# Patient Record
Sex: MALE | Race: WHITE | Employment: OTHER | URBAN - METROPOLITAN AREA
[De-identification: names, ages, dates, MRNs, and addresses within clinical notes are randomized per-mention and may not be internally consistent; named-entity substitution may affect disease eponyms.]

---

## 2017-02-16 PROBLEM — M17.12 PRIMARY OSTEOARTHRITIS OF LEFT KNEE: Chronic | Status: ACTIVE | Noted: 2017-02-16

## 2017-02-17 PROBLEM — E11.9 DIABETES MELLITUS (HCC): Status: ACTIVE | Noted: 2017-02-17

## 2018-05-14 ENCOUNTER — HOSPITAL ENCOUNTER (EMERGENCY)
Age: 63
Discharge: HOME | End: 2018-05-14
Payer: COMMERCIAL

## 2018-05-14 VITALS
BODY MASS INDEX: 30.08 KG/M2 | WEIGHT: 247 LBS | DIASTOLIC BLOOD PRESSURE: 71 MMHG | RESPIRATION RATE: 16 BRPM | TEMPERATURE: 97.8 F | HEIGHT: 76 IN | SYSTOLIC BLOOD PRESSURE: 129 MMHG | OXYGEN SATURATION: 97 % | HEART RATE: 81 BPM

## 2018-05-14 DIAGNOSIS — M54.41 ACUTE RIGHT-SIDED LOW BACK PAIN WITH RIGHT-SIDED SCIATICA: Primary | ICD-10-CM

## 2018-05-14 DIAGNOSIS — M54.12 CERVICAL RADICULOPATHY: ICD-10-CM

## 2018-05-14 PROCEDURE — 96375 TX/PRO/DX INJ NEW DRUG ADDON: CPT

## 2018-05-14 PROCEDURE — 99282 EMERGENCY DEPT VISIT SF MDM: CPT

## 2018-05-14 PROCEDURE — 96374 THER/PROPH/DIAG INJ IV PUSH: CPT

## 2018-05-14 PROCEDURE — 6360000002 HC RX W HCPCS

## 2018-05-14 PROCEDURE — 6360000002 HC RX W HCPCS: Performed by: NURSE PRACTITIONER

## 2018-05-14 RX ORDER — MORPHINE SULFATE 2 MG/ML
INJECTION, SOLUTION INTRAMUSCULAR; INTRAVENOUS
Status: COMPLETED
Start: 2018-05-14 | End: 2018-05-14

## 2018-05-14 RX ORDER — MORPHINE SULFATE 8 MG/ML
8 INJECTION, SOLUTION INTRAMUSCULAR; INTRAVENOUS ONCE
Status: DISCONTINUED | OUTPATIENT
Start: 2018-05-14 | End: 2018-05-15 | Stop reason: HOSPADM

## 2018-05-14 RX ORDER — ORPHENADRINE CITRATE 30 MG/ML
60 INJECTION INTRAMUSCULAR; INTRAVENOUS ONCE
Status: COMPLETED | OUTPATIENT
Start: 2018-05-14 | End: 2018-05-14

## 2018-05-14 RX ORDER — CYCLOBENZAPRINE HCL 10 MG
10 TABLET ORAL 3 TIMES DAILY PRN
Qty: 12 TABLET | Refills: 0 | Status: SHIPPED | OUTPATIENT
Start: 2018-05-14 | End: 2018-05-15 | Stop reason: DRUGHIGH

## 2018-05-14 RX ORDER — HYDROCODONE BITARTRATE AND ACETAMINOPHEN 5; 325 MG/1; MG/1
1 TABLET ORAL EVERY 6 HOURS PRN
Qty: 12 TABLET | Refills: 0 | Status: SHIPPED | OUTPATIENT
Start: 2018-05-14 | End: 2018-05-15 | Stop reason: DRUGHIGH

## 2018-05-14 RX ORDER — METHYLPREDNISOLONE 4 MG/1
TABLET ORAL
Qty: 1 KIT | Status: SHIPPED | OUTPATIENT
Start: 2018-05-14 | End: 2018-05-15 | Stop reason: DRUGHIGH

## 2018-05-14 RX ORDER — DEXAMETHASONE SODIUM PHOSPHATE 10 MG/ML
6 INJECTION INTRAMUSCULAR; INTRAVENOUS ONCE
Status: COMPLETED | OUTPATIENT
Start: 2018-05-14 | End: 2018-05-14

## 2018-05-14 RX ADMIN — DEXAMETHASONE SODIUM PHOSPHATE 6 MG: 10 INJECTION INTRAMUSCULAR; INTRAVENOUS at 22:33

## 2018-05-14 RX ADMIN — ORPHENADRINE CITRATE 60 MG: 30 INJECTION INTRAMUSCULAR; INTRAVENOUS at 22:33

## 2018-05-14 RX ADMIN — MORPHINE SULFATE 8 MG: 2 INJECTION, SOLUTION INTRAMUSCULAR; INTRAVENOUS at 22:33

## 2018-05-14 ASSESSMENT — PAIN DESCRIPTION - FREQUENCY: FREQUENCY: CONTINUOUS

## 2018-05-14 ASSESSMENT — PAIN SCALES - GENERAL
PAINLEVEL_OUTOF10: 8
PAINLEVEL_OUTOF10: 7

## 2018-05-14 ASSESSMENT — PAIN DESCRIPTION - PROGRESSION: CLINICAL_PROGRESSION: GRADUALLY WORSENING

## 2018-05-14 ASSESSMENT — PAIN DESCRIPTION - DESCRIPTORS: DESCRIPTORS: SHARP

## 2018-05-14 ASSESSMENT — PAIN DESCRIPTION - PAIN TYPE: TYPE: ACUTE PAIN;CHRONIC PAIN

## 2018-05-14 ASSESSMENT — PAIN DESCRIPTION - ONSET: ONSET: GRADUAL

## 2018-05-14 ASSESSMENT — PAIN DESCRIPTION - ORIENTATION: ORIENTATION: LOWER;MID

## 2018-05-14 ASSESSMENT — PAIN DESCRIPTION - LOCATION: LOCATION: BACK

## 2018-05-15 RX ORDER — METHYLPREDNISOLONE 4 MG/1
TABLET ORAL
Qty: 1 KIT | Status: SHIPPED | OUTPATIENT
Start: 2018-05-15 | End: 2018-05-21

## 2018-05-15 RX ORDER — HYDROCODONE BITARTRATE AND ACETAMINOPHEN 5; 325 MG/1; MG/1
1 TABLET ORAL EVERY 6 HOURS PRN
Qty: 12 TABLET | Refills: 0 | Status: SHIPPED | OUTPATIENT
Start: 2018-05-15 | End: 2018-05-18

## 2018-05-15 RX ORDER — CYCLOBENZAPRINE HCL 10 MG
10 TABLET ORAL 3 TIMES DAILY PRN
Qty: 12 TABLET | Refills: 0 | Status: SHIPPED | OUTPATIENT
Start: 2018-05-15 | End: 2018-05-25

## 2019-07-08 ENCOUNTER — APPOINTMENT (OUTPATIENT)
Dept: GENERAL RADIOLOGY | Age: 64
End: 2019-07-08
Attending: INTERNAL MEDICINE
Payer: COMMERCIAL

## 2019-07-08 ENCOUNTER — HOSPITAL ENCOUNTER (OUTPATIENT)
Age: 64
Setting detail: OBSERVATION
Discharge: HOME | End: 2019-07-10
Attending: INTERNAL MEDICINE | Admitting: INTERNAL MEDICINE
Payer: COMMERCIAL

## 2019-07-08 ENCOUNTER — APPOINTMENT (OUTPATIENT)
Dept: MRI IMAGING | Age: 64
End: 2019-07-08
Attending: INTERNAL MEDICINE
Payer: COMMERCIAL

## 2019-07-08 PROBLEM — G45.9 TIA (TRANSIENT ISCHEMIC ATTACK): Status: ACTIVE | Noted: 2019-07-08

## 2019-07-08 LAB
ALBUMIN SERPL-MCNC: 4.4 G/DL (ref 3.5–5.2)
ALP BLD-CCNC: 48 U/L (ref 40–129)
ALT SERPL-CCNC: 23 U/L (ref 0–40)
ANION GAP SERPL CALCULATED.3IONS-SCNC: 15 MMOL/L (ref 7–16)
AST SERPL-CCNC: 26 U/L (ref 0–39)
BILIRUB SERPL-MCNC: 0.3 MG/DL (ref 0–1.2)
BUN BLDV-MCNC: 13 MG/DL (ref 8–23)
CALCIUM SERPL-MCNC: 10.3 MG/DL (ref 8.6–10.2)
CHLORIDE BLD-SCNC: 103 MMOL/L (ref 98–107)
CK MB: 2.6 NG/ML (ref 0–7.7)
CO2: 20 MMOL/L (ref 22–29)
CREAT SERPL-MCNC: 0.7 MG/DL (ref 0.7–1.2)
GFR AFRICAN AMERICAN: >60
GFR NON-AFRICAN AMERICAN: >60 ML/MIN/1.73
GLUCOSE BLD-MCNC: 135 MG/DL (ref 74–99)
HCT VFR BLD CALC: 40.3 % (ref 37–54)
HEMOGLOBIN: 13.4 G/DL (ref 12.5–16.5)
LACTIC ACID: 1.9 MMOL/L (ref 0.5–2.2)
MCH RBC QN AUTO: 31.1 PG (ref 26–35)
MCHC RBC AUTO-ENTMCNC: 33.3 % (ref 32–34.5)
MCV RBC AUTO: 93.5 FL (ref 80–99.9)
METER GLUCOSE: 143 MG/DL (ref 74–99)
PDW BLD-RTO: 14 FL (ref 11.5–15)
PLATELET # BLD: 243 E9/L (ref 130–450)
PMV BLD AUTO: 8.8 FL (ref 7–12)
POTASSIUM SERPL-SCNC: 4.2 MMOL/L (ref 3.5–5)
RBC # BLD: 4.31 E12/L (ref 3.8–5.8)
SODIUM BLD-SCNC: 138 MMOL/L (ref 132–146)
TOTAL CK: 106 U/L (ref 20–200)
TOTAL PROTEIN: 7.2 G/DL (ref 6.4–8.3)
TROPONIN: <0.01 NG/ML (ref 0–0.03)
WBC # BLD: 6.7 E9/L (ref 4.5–11.5)

## 2019-07-08 PROCEDURE — 82550 ASSAY OF CK (CPK): CPT

## 2019-07-08 PROCEDURE — 6370000000 HC RX 637 (ALT 250 FOR IP): Performed by: INTERNAL MEDICINE

## 2019-07-08 PROCEDURE — 71045 X-RAY EXAM CHEST 1 VIEW: CPT

## 2019-07-08 PROCEDURE — 36415 COLL VENOUS BLD VENIPUNCTURE: CPT

## 2019-07-08 PROCEDURE — 84484 ASSAY OF TROPONIN QUANT: CPT

## 2019-07-08 PROCEDURE — 70551 MRI BRAIN STEM W/O DYE: CPT

## 2019-07-08 PROCEDURE — 2060000000 HC ICU INTERMEDIATE R&B

## 2019-07-08 PROCEDURE — 93005 ELECTROCARDIOGRAM TRACING: CPT | Performed by: INTERNAL MEDICINE

## 2019-07-08 PROCEDURE — G0378 HOSPITAL OBSERVATION PER HR: HCPCS

## 2019-07-08 PROCEDURE — 80053 COMPREHEN METABOLIC PANEL: CPT

## 2019-07-08 PROCEDURE — 6370000000 HC RX 637 (ALT 250 FOR IP): Performed by: HOSPITALIST

## 2019-07-08 PROCEDURE — 82553 CREATINE MB FRACTION: CPT

## 2019-07-08 PROCEDURE — 6360000002 HC RX W HCPCS: Performed by: HOSPITALIST

## 2019-07-08 PROCEDURE — 96374 THER/PROPH/DIAG INJ IV PUSH: CPT

## 2019-07-08 PROCEDURE — 6370000000 HC RX 637 (ALT 250 FOR IP)

## 2019-07-08 PROCEDURE — 82962 GLUCOSE BLOOD TEST: CPT

## 2019-07-08 PROCEDURE — 83605 ASSAY OF LACTIC ACID: CPT

## 2019-07-08 PROCEDURE — 85027 COMPLETE CBC AUTOMATED: CPT

## 2019-07-08 RX ORDER — ATORVASTATIN CALCIUM 40 MG/1
40 TABLET, FILM COATED ORAL NIGHTLY
Status: DISCONTINUED | OUTPATIENT
Start: 2019-07-08 | End: 2019-07-10 | Stop reason: HOSPADM

## 2019-07-08 RX ORDER — GEMFIBROZIL 600 MG/1
600 TABLET, FILM COATED ORAL
COMMUNITY
End: 2020-02-17 | Stop reason: ALTCHOICE

## 2019-07-08 RX ORDER — ONDANSETRON 2 MG/ML
4 INJECTION INTRAMUSCULAR; INTRAVENOUS EVERY 6 HOURS PRN
Status: DISCONTINUED | OUTPATIENT
Start: 2019-07-08 | End: 2019-07-10 | Stop reason: HOSPADM

## 2019-07-08 RX ORDER — NITROGLYCERIN 0.4 MG/1
0.4 TABLET SUBLINGUAL EVERY 5 MIN PRN
Status: DISCONTINUED | OUTPATIENT
Start: 2019-07-08 | End: 2019-07-10 | Stop reason: HOSPADM

## 2019-07-08 RX ORDER — NITROGLYCERIN 0.4 MG/1
TABLET SUBLINGUAL
Status: COMPLETED
Start: 2019-07-08 | End: 2019-07-08

## 2019-07-08 RX ORDER — ASPIRIN 81 MG/1
TABLET, CHEWABLE ORAL
Status: COMPLETED
Start: 2019-07-08 | End: 2019-07-08

## 2019-07-08 RX ORDER — GEMFIBROZIL 600 MG/1
600 TABLET, FILM COATED ORAL
Status: DISCONTINUED | OUTPATIENT
Start: 2019-07-09 | End: 2019-07-10 | Stop reason: HOSPADM

## 2019-07-08 RX ORDER — DEXTROSE MONOHYDRATE 25 G/50ML
12.5 INJECTION, SOLUTION INTRAVENOUS PRN
Status: DISCONTINUED | OUTPATIENT
Start: 2019-07-08 | End: 2019-07-10 | Stop reason: HOSPADM

## 2019-07-08 RX ORDER — NICOTINE POLACRILEX 4 MG
15 LOZENGE BUCCAL PRN
Status: DISCONTINUED | OUTPATIENT
Start: 2019-07-08 | End: 2019-07-10 | Stop reason: HOSPADM

## 2019-07-08 RX ORDER — ASPIRIN 81 MG/1
81 TABLET ORAL DAILY
Status: DISCONTINUED | OUTPATIENT
Start: 2019-07-09 | End: 2019-07-09

## 2019-07-08 RX ORDER — LISINOPRIL 10 MG/1
10 TABLET ORAL DAILY
Status: DISCONTINUED | OUTPATIENT
Start: 2019-07-09 | End: 2019-07-10 | Stop reason: HOSPADM

## 2019-07-08 RX ORDER — OXYCODONE HYDROCHLORIDE AND ACETAMINOPHEN 5; 325 MG/1; MG/1
1 TABLET ORAL EVERY 4 HOURS PRN
Status: DISCONTINUED | OUTPATIENT
Start: 2019-07-08 | End: 2019-07-10 | Stop reason: HOSPADM

## 2019-07-08 RX ORDER — DEXTROSE MONOHYDRATE 50 MG/ML
100 INJECTION, SOLUTION INTRAVENOUS PRN
Status: DISCONTINUED | OUTPATIENT
Start: 2019-07-08 | End: 2019-07-10 | Stop reason: HOSPADM

## 2019-07-08 RX ORDER — SODIUM CHLORIDE 0.9 % (FLUSH) 0.9 %
10 SYRINGE (ML) INJECTION EVERY 12 HOURS SCHEDULED
Status: DISCONTINUED | OUTPATIENT
Start: 2019-07-08 | End: 2019-07-10 | Stop reason: HOSPADM

## 2019-07-08 RX ORDER — LORAZEPAM 2 MG/ML
0.5 INJECTION INTRAMUSCULAR ONCE
Status: COMPLETED | OUTPATIENT
Start: 2019-07-08 | End: 2019-07-08

## 2019-07-08 RX ORDER — SODIUM CHLORIDE 0.9 % (FLUSH) 0.9 %
10 SYRINGE (ML) INJECTION PRN
Status: DISCONTINUED | OUTPATIENT
Start: 2019-07-08 | End: 2019-07-10 | Stop reason: HOSPADM

## 2019-07-08 RX ADMIN — LORAZEPAM 0.5 MG: 2 INJECTION INTRAMUSCULAR; INTRAVENOUS at 23:07

## 2019-07-08 RX ADMIN — OXYCODONE HYDROCHLORIDE AND ACETAMINOPHEN 1 TABLET: 5; 325 TABLET ORAL at 23:08

## 2019-07-08 RX ADMIN — NITROGLYCERIN: 0.4 TABLET, ORALLY DISINTEGRATING SUBLINGUAL at 21:56

## 2019-07-08 RX ADMIN — ASPIRIN 325 MG: 325 TABLET, COATED ORAL at 21:40

## 2019-07-08 RX ADMIN — NITROGLYCERIN: 0.4 TABLET, ORALLY DISINTEGRATING SUBLINGUAL at 21:38

## 2019-07-08 RX ADMIN — ASPIRIN 81 MG 324 MG: 81 TABLET ORAL at 21:55

## 2019-07-08 ASSESSMENT — PAIN - FUNCTIONAL ASSESSMENT: PAIN_FUNCTIONAL_ASSESSMENT: 0-10

## 2019-07-09 ENCOUNTER — APPOINTMENT (OUTPATIENT)
Dept: CT IMAGING | Age: 64
End: 2019-07-09
Attending: INTERNAL MEDICINE
Payer: COMMERCIAL

## 2019-07-09 LAB
ALBUMIN SERPL-MCNC: 4.2 G/DL (ref 3.5–5.2)
ALP BLD-CCNC: 47 U/L (ref 40–129)
ALT SERPL-CCNC: 25 U/L (ref 0–40)
ANION GAP SERPL CALCULATED.3IONS-SCNC: 16 MMOL/L (ref 7–16)
AST SERPL-CCNC: 33 U/L (ref 0–39)
BASOPHILS ABSOLUTE: 0.03 E9/L (ref 0–0.2)
BASOPHILS RELATIVE PERCENT: 0.5 % (ref 0–2)
BILIRUB SERPL-MCNC: 0.3 MG/DL (ref 0–1.2)
BUN BLDV-MCNC: 15 MG/DL (ref 8–23)
CALCIUM SERPL-MCNC: 9.7 MG/DL (ref 8.6–10.2)
CHLORIDE BLD-SCNC: 102 MMOL/L (ref 98–107)
CHOLESTEROL, FASTING: 193 MG/DL (ref 0–199)
CO2: 21 MMOL/L (ref 22–29)
CREAT SERPL-MCNC: 0.9 MG/DL (ref 0.7–1.2)
EOSINOPHILS ABSOLUTE: 0.23 E9/L (ref 0.05–0.5)
EOSINOPHILS RELATIVE PERCENT: 3.8 % (ref 0–6)
GFR AFRICAN AMERICAN: >60
GFR NON-AFRICAN AMERICAN: >60 ML/MIN/1.73
GLUCOSE BLD-MCNC: 167 MG/DL (ref 74–99)
HBA1C MFR BLD: 8.1 % (ref 4–5.6)
HCT VFR BLD CALC: 38.8 % (ref 37–54)
HDLC SERPL-MCNC: 25 MG/DL
HEMOGLOBIN: 12.5 G/DL (ref 12.5–16.5)
IMMATURE GRANULOCYTES #: 0.05 E9/L
IMMATURE GRANULOCYTES %: 0.8 % (ref 0–5)
LDL CHOLESTEROL CALCULATED: ABNORMAL MG/DL (ref 0–99)
LYMPHOCYTES ABSOLUTE: 2.31 E9/L (ref 1.5–4)
LYMPHOCYTES RELATIVE PERCENT: 38.2 % (ref 20–42)
MAGNESIUM: 2.1 MG/DL (ref 1.6–2.6)
MCH RBC QN AUTO: 30.8 PG (ref 26–35)
MCHC RBC AUTO-ENTMCNC: 32.2 % (ref 32–34.5)
MCV RBC AUTO: 95.6 FL (ref 80–99.9)
METER GLUCOSE: 123 MG/DL (ref 74–99)
METER GLUCOSE: 166 MG/DL (ref 74–99)
METER GLUCOSE: 206 MG/DL (ref 74–99)
METER GLUCOSE: 211 MG/DL (ref 74–99)
METER GLUCOSE: 236 MG/DL (ref 74–99)
MONOCYTES ABSOLUTE: 0.53 E9/L (ref 0.1–0.95)
MONOCYTES RELATIVE PERCENT: 8.8 % (ref 2–12)
NEUTROPHILS ABSOLUTE: 2.89 E9/L (ref 1.8–7.3)
NEUTROPHILS RELATIVE PERCENT: 47.9 % (ref 43–80)
PDW BLD-RTO: 14.2 FL (ref 11.5–15)
PLATELET # BLD: 235 E9/L (ref 130–450)
PMV BLD AUTO: 9.1 FL (ref 7–12)
POTASSIUM REFLEX MAGNESIUM: 4.4 MMOL/L (ref 3.5–5)
RBC # BLD: 4.06 E12/L (ref 3.8–5.8)
SODIUM BLD-SCNC: 139 MMOL/L (ref 132–146)
TOTAL PROTEIN: 6.5 G/DL (ref 6.4–8.3)
TRIGLYCERIDE, FASTING: 468 MG/DL (ref 0–149)
VLDLC SERPL CALC-MCNC: ABNORMAL MG/DL
WBC # BLD: 6 E9/L (ref 4.5–11.5)

## 2019-07-09 PROCEDURE — 96375 TX/PRO/DX INJ NEW DRUG ADDON: CPT

## 2019-07-09 PROCEDURE — 80061 LIPID PANEL: CPT

## 2019-07-09 PROCEDURE — 96376 TX/PRO/DX INJ SAME DRUG ADON: CPT

## 2019-07-09 PROCEDURE — 2580000003 HC RX 258: Performed by: HOSPITALIST

## 2019-07-09 PROCEDURE — 6370000000 HC RX 637 (ALT 250 FOR IP): Performed by: HOSPITALIST

## 2019-07-09 PROCEDURE — 70498 CT ANGIOGRAPHY NECK: CPT

## 2019-07-09 PROCEDURE — G0378 HOSPITAL OBSERVATION PER HR: HCPCS

## 2019-07-09 PROCEDURE — 85025 COMPLETE CBC W/AUTO DIFF WBC: CPT

## 2019-07-09 PROCEDURE — 82962 GLUCOSE BLOOD TEST: CPT

## 2019-07-09 PROCEDURE — 6360000002 HC RX W HCPCS: Performed by: INTERNAL MEDICINE

## 2019-07-09 PROCEDURE — 97162 PT EVAL MOD COMPLEX 30 MIN: CPT

## 2019-07-09 PROCEDURE — 6360000002 HC RX W HCPCS: Performed by: HOSPITALIST

## 2019-07-09 PROCEDURE — 92523 SPEECH SOUND LANG COMPREHEN: CPT

## 2019-07-09 PROCEDURE — 83735 ASSAY OF MAGNESIUM: CPT

## 2019-07-09 PROCEDURE — 97166 OT EVAL MOD COMPLEX 45 MIN: CPT

## 2019-07-09 PROCEDURE — 97535 SELF CARE MNGMENT TRAINING: CPT

## 2019-07-09 PROCEDURE — 99218 PR INITIAL OBSERVATION CARE/DAY 30 MINUTES: CPT | Performed by: PSYCHIATRY & NEUROLOGY

## 2019-07-09 PROCEDURE — 96372 THER/PROPH/DIAG INJ SC/IM: CPT

## 2019-07-09 PROCEDURE — 80053 COMPREHEN METABOLIC PANEL: CPT

## 2019-07-09 PROCEDURE — 36415 COLL VENOUS BLD VENIPUNCTURE: CPT

## 2019-07-09 PROCEDURE — 83036 HEMOGLOBIN GLYCOSYLATED A1C: CPT

## 2019-07-09 PROCEDURE — 70496 CT ANGIOGRAPHY HEAD: CPT

## 2019-07-09 PROCEDURE — 97530 THERAPEUTIC ACTIVITIES: CPT

## 2019-07-09 PROCEDURE — 6360000004 HC RX CONTRAST MEDICATION: Performed by: RADIOLOGY

## 2019-07-09 RX ORDER — KETOROLAC TROMETHAMINE 30 MG/ML
15 INJECTION, SOLUTION INTRAMUSCULAR; INTRAVENOUS EVERY 6 HOURS
Status: DISCONTINUED | OUTPATIENT
Start: 2019-07-09 | End: 2019-07-10 | Stop reason: HOSPADM

## 2019-07-09 RX ORDER — ASPIRIN 81 MG/1
162 TABLET ORAL DAILY
Status: DISCONTINUED | OUTPATIENT
Start: 2019-07-10 | End: 2019-07-10 | Stop reason: HOSPADM

## 2019-07-09 RX ADMIN — Medication 10 ML: at 00:34

## 2019-07-09 RX ADMIN — IOPAMIDOL 60 ML: 755 INJECTION, SOLUTION INTRAVENOUS at 00:09

## 2019-07-09 RX ADMIN — OXYCODONE HYDROCHLORIDE AND ACETAMINOPHEN 1 TABLET: 5; 325 TABLET ORAL at 12:03

## 2019-07-09 RX ADMIN — OXYCODONE HYDROCHLORIDE AND ACETAMINOPHEN 1 TABLET: 5; 325 TABLET ORAL at 03:24

## 2019-07-09 RX ADMIN — OXYCODONE HYDROCHLORIDE AND ACETAMINOPHEN 1 TABLET: 5; 325 TABLET ORAL at 07:54

## 2019-07-09 RX ADMIN — INSULIN LISPRO 2 UNITS: 100 INJECTION, SOLUTION INTRAVENOUS; SUBCUTANEOUS at 07:54

## 2019-07-09 RX ADMIN — LISINOPRIL 10 MG: 10 TABLET ORAL at 09:22

## 2019-07-09 RX ADMIN — ENOXAPARIN SODIUM 40 MG: 40 INJECTION SUBCUTANEOUS at 09:22

## 2019-07-09 RX ADMIN — ATORVASTATIN CALCIUM 40 MG: 40 TABLET, FILM COATED ORAL at 00:33

## 2019-07-09 RX ADMIN — INSULIN LISPRO 4 UNITS: 100 INJECTION, SOLUTION INTRAVENOUS; SUBCUTANEOUS at 17:49

## 2019-07-09 RX ADMIN — KETOROLAC TROMETHAMINE 15 MG: 30 INJECTION, SOLUTION INTRAMUSCULAR at 16:34

## 2019-07-09 RX ADMIN — INSULIN LISPRO 4 UNITS: 100 INJECTION, SOLUTION INTRAVENOUS; SUBCUTANEOUS at 12:12

## 2019-07-09 RX ADMIN — Medication 10 ML: at 09:22

## 2019-07-09 RX ADMIN — ASPIRIN 81 MG: 81 TABLET ORAL at 09:22

## 2019-07-09 RX ADMIN — GEMFIBROZIL 600 MG: 600 TABLET ORAL at 16:34

## 2019-07-09 RX ADMIN — Medication 10 ML: at 21:08

## 2019-07-09 RX ADMIN — INSULIN LISPRO 4 UNITS: 100 INJECTION, SOLUTION INTRAVENOUS; SUBCUTANEOUS at 21:06

## 2019-07-09 RX ADMIN — KETOROLAC TROMETHAMINE 15 MG: 30 INJECTION, SOLUTION INTRAMUSCULAR at 21:07

## 2019-07-09 RX ADMIN — ATORVASTATIN CALCIUM 40 MG: 40 TABLET, FILM COATED ORAL at 21:06

## 2019-07-09 RX ADMIN — GEMFIBROZIL 600 MG: 600 TABLET ORAL at 06:23

## 2019-07-09 ASSESSMENT — PAIN SCALES - GENERAL
PAINLEVEL_OUTOF10: 9
PAINLEVEL_OUTOF10: 0
PAINLEVEL_OUTOF10: 6
PAINLEVEL_OUTOF10: 9
PAINLEVEL_OUTOF10: 8
PAINLEVEL_OUTOF10: 10

## 2019-07-09 NOTE — PROGRESS NOTES
Physical Therapy  Initial Assessment     Name: Florencio Kirby  : 1955  MRN: [id number]    Date of Service: 2019    Evaluating PT: Dorie Ash, PT, DPT [id number]    Room #:  7022/2684-K    Diagnosis: TIA  Precautions: Fall risk, dysarthria    Pt lives with wife in a single story house with 0 stair(s) and 0 rail(s) to enter. Bed is on the first floor and bath is on the first floor. Pt ambulated without AD Independent prior to admission. Pt owns SPC, quad cane, and [employer]. HPI: Pt presented to the ED on  for L facial droop and slurred speech. Initial Evaluation  Date: 19 Treatment Date: Short Term/ Long Term   Goals   AM-PAC 6 Clicks 61/90     Was pt agreeable to Eval/treatment? Yes      Does pt have pain? 6-7/10 L chest pain, 9-10/10 R headache, neck, and shoulder pain     Bed Mobility  Rolling: NT  Supine to sit: SBA  Sit to supine: NT  Scooting: SBA toward EOB  Rolling: Independent   Supine to sit: Independent   Sit to supine: Independent   Scooting: Independent    Transfers Sit to stand: Min A  Stand to sit: Min A  Stand pivot: Mod A with [employer]  Sit to stand: Supervision  Stand to sit: Supervision  Stand pivot: SBA with [employer]   Ambulation   30 feet with [employer] with Mod A  200 feet with [employer] with SBA   Stair negotiation: NT  NA   ROM B UE: Refer to OT note  B LE: WFL     Strength B UE: Refer to OT note  B LE: 4-/5 grossly     Balance Sitting EOB: SBA  Dynamic standing: Mod A with [employer]  Sitting EOB: Supervision  Dynamic standing: SBA with WW     Pt is A & O x: 4 to person, place, month/year, and situation. Sensation: Pt denies numbness and tingling to extremities. Coordination: NT  Edema: Unremarkable. ASSESSMENT    Patient education  Pt educated on proper technique with [employer].    Patient response to education:   Pt verbalized understanding Pt demonstrated skill Pt requires further education in this area   Yes Yes  Reinforce     Comments:  Pt was supine in bed upon room entry, agreeable to PT evaluation.  Pt is dysarthric and difficult to understand at times. Pt performed supine to sit transfer and required increased time to accomplish task. Pt has chronic R shoulder pain and complained of increased pain with mobility. Pt performed sit to stand transfer and ambulated short distance within room. Pt ambulates with slow gait speed and small steps. Pt cued for upright gaze. Pt ambulated to neighbor's bed and reported he was dizzy. Pt requested to return to bed due to symptoms. Pt ambulated back to bedside chair and was seated. Symptoms subsided once sitting. Pt verbalized understanding to call for assistance when he wishes to return to bed. Pt was left seated with all needs met at conclusion of session. Pts/family goals: To return home. Patient and or family understand(s) diagnosis, prognosis, and plan of care:  Yes. PLAN  PT care will be provided in accordance with the objectives noted above. Whenever appropriate, clear delegation orders will be provided for nursing staff. Exercises and functional mobility practice will be used as well as appropriate assistive devices or modalities to obtain goals. Patient and family education will also be administered as needed. Frequency of treatments: 2-5x/week x 2-3 days.     Time in: 1140  Time out: 4228 Christopher Vasquez, PT, DPT  [id number]

## 2019-07-09 NOTE — PROGRESS NOTES
7/9/2019 1400  Gross per 24 hour   Intake 840 ml   Output --   Net 840 ml       Labs:   Recent Labs     07/08/19 2149 07/09/19 0648   WBC 6.7 6.0   HGB 13.4 12.5   HCT 40.3 38.8    235       Recent Labs     07/08/19 2149 07/09/19  0648    139   K 4.2 4.4    102   CO2 20* 21*   BUN 13 15   CREATININE 0.7 0.9   CALCIUM 10.3* 9.7       Recent Labs     07/08/19 2149 07/09/19  0648   PROT 7.2 6.5   ALKPHOS 48 47   ALT 23 25   AST 26 33   BILITOT 0.3 0.3       No results for input(s): INR in the last 72 hours. Recent Labs     07/08/19 2149   CKTOTAL 106   TROPONINI <0.01       Chronic labs:  Lab Results   Component Value Date    HDL 25 07/09/2019    LDLCALC - (AA) 07/09/2019    LABA1C 8.1 (H) 07/09/2019       Radiology:  Imaging studies reviewed today. ASSESSMENT:    Active Problems:    TIA (transient ischemic attack)  Resolved Problems:    * No resolved hospital problems. *  DM2  HTN  HLD  Smoker  Bronchitis     PLAN:  Neuro consulted awaiting recs  Chest pain is musculoskeletal in nature  -toradol started  PT/OT - pt may need some rehab  Glycemic control       Diet: DIET CARDIAC;  Code Status: Full Code  PT/OT Eval Status:   ordered  DVT Prophylaxis:   lovenox  Recommended disposition at discharge:  pending pt/ot    +++++++++++++++++++++++++++++++++++++++++++++++++  Elizabeth Neil MD   [facility].  +++++++++++++++++++++++++++++++++++++++++++++++++  NOTE: This report was transcribed using voice recognition software.  Every effort was made to ensure accuracy; however, inadvertent computerized transcription errors may be present.

## 2019-07-09 NOTE — PROGRESS NOTES
in 1 week  if warranted. Patient stated goals: Agreed with above  Treatment goals discussed with Patient  The Patient understand the diagnosis, prognosis and plan of care. The admitting diagnosis and active problem list, as listed below have been reviewed prior to initiation of this evaluation.      ADMITTING DIAGNOSIS: TIA (transient ischemic attack) [G45.9]     ACTIVE PROBLEM LIST:   Patient Active Problem List   Diagnosis    Primary osteoarthritis of left knee    Diabetes mellitus ([facility] [address]    TIA (transient ischemic attack)

## 2019-07-09 NOTE — H&P
noted in the HPI. All other systems reviewed and negative. PHYSICAL EXAM PERFORMED:    /71   Pulse 91   Temp 97.2 °F (36.2 °C) (Temporal)   Resp 16   Ht 6' 4\" (1.93 m)   Wt 242 lb 3.2 oz (109.9 kg)   SpO2 95%   BMI 29.48 kg/m²     General appearance:  No apparent distress, appears stated age and cooperative. HEENT:  Normal cephalic, atraumatic without obvious deformity. Pupils equal, round, and reactive to light. Extra ocular muscles intact. Conjunctivae/corneas clear. Neck: Supple, with full range of motion. No jugular venous distention. Trachea midline. Respiratory:  Normal respiratory effort. Clear to auscultation, bilaterally without Rales/Wheezes/Rhonchi. Chest wall pain reproducible  Cardiovascular:  Regular rate and rhythm with normal S1/S2 without murmurs, rubs or gallops. Abdomen: Soft, non-tender, non-distended with normal bowel sounds. Musculoskeletal:  No clubbing, cyanosis or edema bilaterally. Full range of motion without deformity. Skin: Skin color, texture, turgor normal.  No rashes or lesions. Neurologic:  Slurred speech, left sided facial droop, LUE possible mild weakness ? Shoulder pain limiting ROM left leg weak due to pain ? Psychiatric:  Alert and oriented, thought content appropriate, normal insight        Labs:     Recent Labs     07/08/19  2149   WBC 6.7   HGB 13.4   HCT 40.3        No results for input(s): NA, K, CL, CO2, BUN, CREATININE, CALCIUM, PHOS in the last 72 hours. Invalid input(s): MAGNES  No results for input(s): AST, ALT, BILIDIR, BILITOT, ALKPHOS in the last 72 hours. No results for input(s): INR in the last 72 hours. No results for input(s): Laurel Callander in the last 72 hours.     Urinalysis:    No results found for: Harshal Paredes, BACTERIA, RBCUA, BLOODU, [address], [address]    Radiology:         XR CHEST PORTABLE    (Results Pending)       ASSESSMENT:    Active Hospital Problems    Diagnosis Date Noted    TIA (transient ischemic attack) [G45.9] 07/08/2019       60 yo male hx dm htn hlp on meds controlled came to osh at 445 pm having stroke like symptoms of left facial droop/slurred speech and NIH 3, ekg nsr, PT/INR/CBC unremarkable, ct head negative , sent here for eval, on way here had left upper chest wall pain , ekg negative, has diffuse OA pain in shoulder legs, stat labs ordered pending      TIA vs CVA  htn  Dm  hlp  Smoker  Chest wall pain noncardiac    Observation  R/o cva  Home meds/  Neuro c/s  Stat mri/mra head/neck after labs- cxr negative cbc unremarkable  Trop negative  iss    DVT Prophylaxis: lovenox  Diet: No diet orders on file  Code Status: Prior    PT/OT Eval Status: ordered    Dispo - obs       Igor Turner MD    Thank you No primary care provider on file. for the opportunity to be involved in this patient's care. If you have any questions or concerns please feel free to contact me at [phone].

## 2019-07-09 NOTE — PROGRESS NOTES
Occupational Therapy  . OCCUPATIONAL THERAPY INITIAL EVALUATION      Date:2019  Patient Name: Brisa Foley  MRN: [id number]  : 1955  Room: [facility]    Modified Fields Scale   Score     Description  0             No symptoms  1             No significant disability despite symptoms  2             Slight disability; able to look after own affairs  3             Moderate disability; able to ambulate without assist/ requires assist with ADLs  4             Moderate/Severe disability;requires assist to ambulate/assist with ADLs  5             Severe disability;bedridden/incontinent   6               Score:   4    Evaluating OT: MAITE Tamayo/L     AM-PAC Daily Activity Raw Score:   Recommended Adaptive Equipment: continue to assess     Comments: Based on patient's functional performance as stated above and level of assistance needed prior to admission, this therapist believes that the patient would benefit from interferes with ADLs so that patient cannot function at a less intensive care level of care during evaluation and treatment      Diagnosis: TIA; L sided weakness impaired speech     Pertinent Medical History: OA, R Rotator cuff repair/injury, L TKA    Precautions:  Falls, dysarthric speech      Home Living: Pt lives wife (calls her momma)-can assist)  in a 1 story with 12 step(s) to enter front 0 [address] back door, rail(s) on front steps; bed/bath on 1st floor  Bathroom setup: Tub-Shower combo  Equipment owned: shower chair, cane, [employer], BSC? Prior Level of Function: IND with ADLs shared assist with mom with IADLs; using no AD for ambulation.  +light homemaking   Driving: yes  Occupation: working as  on [email or web address]    Pain Level: L pectoral region and HA  Cognition: A&O: 3; Follows 1-2 step directions   Memory: G   Sequencing: F+   Problem solving: F   Judgement/safety: F     Functional Assessment:   Initial Eval Status  Date:  Treatment Status  Date: Short Term Goals  Treatment

## 2019-07-09 NOTE — PROGRESS NOTES
Patient states that the Percocet is not working for his pain and asking for something else. Dr. Adele Oliveros notified via [employer] of patient's request. Awaiting new orders/call back    New order received for toradol.

## 2019-07-09 NOTE — CONSULTS
VII: facial muscle function - upper  Normal   VII: facial muscle function - lower Normal   VIII: hearing Normal   IX: soft palate elevation  Normal   IX,X: gag reflex NA   XI: trapezius strength  Diminished on R 2/2 pain   XI: sternocleidomastoid strength Diminished on R 2/2 pain   XI: neck extension strength  Limited 2/2 chronic pain   XII: tongue strength  Normal     Motor:   Right   5/5              Left   5/5               Right Bicep  5/5           Left Bicep  5/5                     Right Quadriceps  5/5          Left Quadriceps    5/5           Right Gastrocnemius    5/5    Left Gastrocnemius   5/5  Right Ant Tibialis   5/5  Left Ant Tibialis   5/5   5/5 throughout  Normal bulk and tone   No drift  No abnormal movements    Sensory:  LT diminished on L face, otherwise wnl    Coordination:   Normal finger to nose and heel to shin    Gait:  Not assessed    DTR:   Right Brachioradialis reflex 2+  Left Brachioradialis reflex 2+  Right Biceps reflex 2+  Left Biceps reflex 2+  Right Triceps reflex 2+  Left Triceps reflex 2+  Right Quadriceps reflex 2+  Left Quadriceps reflex 2+  Right Achilles reflex 2+  Left Achilles reflex 2+    No Babinskis  No Torres's     No other pathological reflexes noted.     Laboratory/Radiology:     Results for orders placed or performed during the hospital encounter of 07/08/19   CBC   Result Value Ref Range    WBC 6.7 4.5 - 11.5 E9/L    RBC 4.31 3.80 - 5.80 E12/L    Hemoglobin 13.4 12.5 - 16.5 g/dL    Hematocrit 40.3 37.0 - 54.0 %    MCV 93.5 80.0 - 99.9 fL    MCH 31.1 26.0 - 35.0 pg    MCHC 33.3 32.0 - 34.5 %    RDW 14.0 11.5 - 15.0 fL    Platelets 094 307 - 811 E9/L    MPV 8.8 7.0 - 12.0 fL   Comprehensive Metabolic Panel   Result Value Ref Range    Sodium 138 132 - 146 mmol/L    Potassium 4.2 3.5 - 5.0 mmol/L    Chloride 103 98 - 107 mmol/L    CO2 20 (L) 22 - 29 mmol/L    Anion Gap 15 7 - 16 mmol/L    Glucose 135 (H) 74 - 99 mg/dL    BUN 13 8 - 23 mg/dL    CREATININE 0.7 0.7 - 1.2 mg/dL    GFR Non-African American >60 >=60 mL/min/1.73    GFR African American >60     Calcium 10.3 (H) 8.6 - 10.2 mg/dL    Total Protein 7.2 6.4 - 8.3 g/dL    Alb 4.4 3.5 - 5.2 g/dL    Total Bilirubin 0.3 0.0 - 1.2 mg/dL    Alkaline Phosphatase 48 40 - 129 U/L    ALT 23 0 - 40 U/L    AST 26 0 - 39 U/L   Lactic Acid, Plasma   Result Value Ref Range    Lactic Acid 1.9 0.5 - 2.2 mmol/L   Troponin   Result Value Ref Range    Troponin <0.01 0.00 - 0.03 ng/mL   CK   Result Value Ref Range    Total  20 - 200 U/L   CK MB   Result Value Ref Range    CK-MB 2.6 0.0 - 7.7 ng/mL   Comprehensive Metabolic Panel w/ Reflex to MG   Result Value Ref Range    Sodium 139 132 - 146 mmol/L    Potassium reflex Magnesium 4.4 3.5 - 5.0 mmol/L    Chloride 102 98 - 107 mmol/L    CO2 21 (L) 22 - 29 mmol/L    Anion Gap 16 7 - 16 mmol/L    Glucose 167 (H) 74 - 99 mg/dL    BUN 15 8 - 23 mg/dL    CREATININE 0.9 0.7 - 1.2 mg/dL    GFR Non-African American >60 >=60 mL/min/1.73    GFR African American >60     Calcium 9.7 8.6 - 10.2 mg/dL    Total Protein 6.5 6.4 - 8.3 g/dL    Alb 4.2 3.5 - 5.2 g/dL    Total Bilirubin 0.3 0.0 - 1.2 mg/dL    Alkaline Phosphatase 47 40 - 129 U/L    ALT 25 0 - 40 U/L    AST 33 0 - 39 U/L   Magnesium   Result Value Ref Range    Magnesium 2.1 1.6 - 2.6 mg/dL   CBC auto differential   Result Value Ref Range    WBC 6.0 4.5 - 11.5 E9/L    RBC 4.06 3.80 - 5.80 E12/L    Hemoglobin 12.5 12.5 - 16.5 g/dL    Hematocrit 38.8 37.0 - 54.0 %    MCV 95.6 80.0 - 99.9 fL    MCH 30.8 26.0 - 35.0 pg    MCHC 32.2 32.0 - 34.5 %    RDW 14.2 11.5 - 15.0 fL    Platelets 171 235 - 371 E9/L    MPV 9.1 7.0 - 12.0 fL    Neutrophils % 47.9 43.0 - 80.0 %    Immature Granulocytes % 0.8 0.0 - 5.0 %    Lymphocytes % 38.2 20.0 - 42.0 %    Monocytes % 8.8 2.0 - 12.0 %    Eosinophils % 3.8 0.0 - 6.0 %    Basophils % 0.5 0.0 - 2.0 %    Neutrophils # 2.89 1.80 - 7.30 E9/L    Immature Granulocytes # 0.05 E9/L    Lymphocytes # 2.31 1.50 - 4.00

## 2019-07-10 VITALS
TEMPERATURE: 97.8 F | HEIGHT: 76 IN | WEIGHT: 244.13 LBS | SYSTOLIC BLOOD PRESSURE: 113 MMHG | HEART RATE: 84 BPM | BODY MASS INDEX: 29.73 KG/M2 | RESPIRATION RATE: 20 BRPM | OXYGEN SATURATION: 97 % | DIASTOLIC BLOOD PRESSURE: 71 MMHG

## 2019-07-10 PROBLEM — Z87.891 EX-SMOKER: Status: ACTIVE | Noted: 2019-07-10

## 2019-07-10 PROBLEM — E78.5 HYPERLIPIDEMIA: Status: ACTIVE | Noted: 2019-07-10

## 2019-07-10 LAB
EKG ATRIAL RATE: 97 BPM
EKG P AXIS: 40 DEGREES
EKG P-R INTERVAL: 146 MS
EKG Q-T INTERVAL: 332 MS
EKG QRS DURATION: 86 MS
EKG QTC CALCULATION (BAZETT): 421 MS
EKG R AXIS: 27 DEGREES
EKG T AXIS: 27 DEGREES
EKG VENTRICULAR RATE: 97 BPM
LV EF: 65 %
LVEF MODALITY: NORMAL
METER GLUCOSE: 162 MG/DL (ref 74–99)
METER GLUCOSE: 202 MG/DL (ref 74–99)

## 2019-07-10 PROCEDURE — 93010 ELECTROCARDIOGRAM REPORT: CPT | Performed by: INTERNAL MEDICINE

## 2019-07-10 PROCEDURE — G0378 HOSPITAL OBSERVATION PER HR: HCPCS

## 2019-07-10 PROCEDURE — 96372 THER/PROPH/DIAG INJ SC/IM: CPT

## 2019-07-10 PROCEDURE — 6370000000 HC RX 637 (ALT 250 FOR IP): Performed by: PSYCHIATRY & NEUROLOGY

## 2019-07-10 PROCEDURE — 2580000003 HC RX 258: Performed by: HOSPITALIST

## 2019-07-10 PROCEDURE — 6370000000 HC RX 637 (ALT 250 FOR IP): Performed by: HOSPITALIST

## 2019-07-10 PROCEDURE — 97110 THERAPEUTIC EXERCISES: CPT

## 2019-07-10 PROCEDURE — 96376 TX/PRO/DX INJ SAME DRUG ADON: CPT

## 2019-07-10 PROCEDURE — 92507 TX SP LANG VOICE COMM INDIV: CPT

## 2019-07-10 PROCEDURE — 6360000002 HC RX W HCPCS: Performed by: INTERNAL MEDICINE

## 2019-07-10 PROCEDURE — 93306 TTE W/DOPPLER COMPLETE: CPT

## 2019-07-10 PROCEDURE — 6360000002 HC RX W HCPCS: Performed by: HOSPITALIST

## 2019-07-10 PROCEDURE — 82962 GLUCOSE BLOOD TEST: CPT

## 2019-07-10 RX ORDER — ATORVASTATIN CALCIUM 40 MG/1
40 TABLET, FILM COATED ORAL NIGHTLY
Qty: 30 TABLET | Refills: 3 | Status: SHIPPED | OUTPATIENT
Start: 2019-07-10

## 2019-07-10 RX ADMIN — GEMFIBROZIL 600 MG: 600 TABLET ORAL at 06:51

## 2019-07-10 RX ADMIN — INSULIN LISPRO 4 UNITS: 100 INJECTION, SOLUTION INTRAVENOUS; SUBCUTANEOUS at 12:36

## 2019-07-10 RX ADMIN — KETOROLAC TROMETHAMINE 15 MG: 30 INJECTION, SOLUTION INTRAMUSCULAR at 11:06

## 2019-07-10 RX ADMIN — Medication 10 ML: at 09:01

## 2019-07-10 RX ADMIN — ASPIRIN 162 MG: 81 TABLET ORAL at 09:01

## 2019-07-10 RX ADMIN — KETOROLAC TROMETHAMINE 15 MG: 30 INJECTION, SOLUTION INTRAMUSCULAR at 04:42

## 2019-07-10 RX ADMIN — INSULIN LISPRO 2 UNITS: 100 INJECTION, SOLUTION INTRAVENOUS; SUBCUTANEOUS at 09:00

## 2019-07-10 RX ADMIN — ENOXAPARIN SODIUM 40 MG: 40 INJECTION SUBCUTANEOUS at 09:03

## 2019-07-10 ASSESSMENT — PAIN SCALES - GENERAL
PAINLEVEL_OUTOF10: 5
PAINLEVEL_OUTOF10: 0
PAINLEVEL_OUTOF10: 5

## 2019-07-10 NOTE — DISCHARGE SUMMARY
Hospital Medicine Discharge Summary    Patient ID: Laura Carlota      Patient's PCP: Amelie Wilcox DO    Admit Date: 7/8/2019     Discharge Date: 7/10/2019      Admitting Physician: Claritza Mcpherson DO     Discharge Physician: JAMAL Zarate - CNP     Discharge Diagnoses: Active Hospital Problems    Diagnosis Date Noted    Hyperlipidemia [E78.5] 07/10/2019    Ex-smoker [N05.613] 07/10/2019    TIA (transient ischemic attack) [G45.9] 07/08/2019    Diabetes mellitus ([facility] [address] [E11.9] 02/17/2017       The patient was seen and examined on day of discharge and this discharge summary is in conjunction with any daily progress note from day of discharge. Hospital Course: 60 yo male hx dm htn hlp on meds controlled came to OSH at 445 pm having stroke like symptoms of left facial droop/slurred speech and NIH 3, ekg nsr, PT/INR/CBC unremarkable, ct head negative , sent here for eval, on way here had left upper chest wall pain , ekg negative, has diffuse OA pain in shoulder legs. MRI of the brain negative CTA of neck and head negative. Neurology evaluated patient. Patient determined to have TIA. Patient was discharged in stable condition to home. Exam:     /71   Pulse 84   Temp 97.8 °F (36.6 °C) (Temporal)   Resp 20   Ht 6' 4\" (1.93 m)   Wt 244 lb 2 oz (110.7 kg)   SpO2 97%   BMI 29.72 kg/m²     General appearance: No apparent distress, appears stated age and cooperative. HEENT: Pupils equal, round, and reactive to light. Conjunctivae/corneas clear. Neck: Supple, with full range of motion. No jugular venous distention. Trachea midline. Respiratory:  Normal respiratory effort. Clear to auscultation, bilaterally without Rales/Wheezes/Rhonchi. Cardiovascular: Regular rate and rhythm with normal S1/S2 without murmurs, rubs or gallops. Abdomen: Soft, non-tender, non-distended with normal bowel sounds. Musculoskeletal: No clubbing, cyanosis or edema bilaterally.   Full range of motion without deformity. Skin: Skin color, texture, turgor normal.  No rashes or lesions. Neurologic:  Neurovascularly intact without any focal sensory/motor deficits. Psychiatric: Alert and oriented, thought content appropriate, normal insight    Consults:     IP CONSULT TO NEUROLOGY    Significant Diagnostic Studies:    Xr Chest Portable  Result Date: 7/8/2019  NO ACUTE CARDIOPULMONARY PROCESS     Cta Neck W Contrast  Result Date: 7/9/2019  1. Calcified plaque bilateral proximal ICA causing mild, less than 50% ICA stenosis using NASCET criteria. 2. Patent bilateral cervical vertebral arteries. COMMENT:  Reference per NASCET criteria for degree of stenosis: Mild: less than 50% stenosis. Moderate: 50-69% stenosis. Severe: 70-94% stenosis. Near occlusion: 95-99% stenosis. This report has been electronically signed by Cecil Live MD.    Cta Head W Contrast  Result Date: 7/9/2019  No acute findings. This report has been electronically signed by Cecil Live MD.    Mri Brain Wo Contrast  Result Date: 7/9/2019  No acute stroke or other acute process. No mass. Senescent changes with atrophy, white matter chronic ischemia, remote left periventricular lacune. This report has been electronically signed by Cecil Live MD.      Disposition:   Home in stable condition    Discharge Instructions/Follow-up:    Continue medications, increase aspirin to 2 baby aspirin daily, start statin  Follow-up in stroke clinic  Follow-up with PCP    Code Status:  Full Code     Activity: activity as tolerated    Diet: cardiac diet    Labs:  For convenience and continuity at follow-up the following most recent labs are provided:      CBC:    Lab Results   Component Value Date    WBC 6.0 07/09/2019    HGB 12.5 07/09/2019    HCT 38.8 07/09/2019     07/09/2019       Renal:    Lab Results   Component Value Date     07/09/2019    K 4.4 07/09/2019     07/09/2019    CO2 21 07/09/2019    BUN 15 07/09/2019    CREATININE 0.9 07/09/2019

## 2019-07-10 NOTE — CARE COORDINATION
Patient more alert today, left eye open with good eye contact, no slurred speech and chest pain gone. He is  sitting up in chair. Explained PT AM NITO 14, OT AM NITO 13 and benefit of USHA and HHC with PT/OT. Pt declined USHA & HHC. He said he's walking better with no walker. Phoned wife Jyoti Barillaseron, explain PT/OT evals and benefit of USHA or HHC PT/OT. She aslo declined both. Plan is home with wife.   Corey Sinha RN CM

## 2019-07-10 NOTE — PROGRESS NOTES
Pt seen for speech therapy. Handout was provided pt on oral motor and coordination exercises that can be completed at home to improve intelligibility and strength. Education was provided on the importance of independent completion of exercises. Pt completed the oral motor and coordination exercises with fair- good ability. Pt demonstrated mild speech distortions. Will continue.      [facility]  Speech Language Pathologist Student Intern

## 2020-02-17 ENCOUNTER — OFFICE VISIT (OUTPATIENT)
Dept: NEUROLOGY | Age: 65
End: 2020-02-17
Payer: COMMERCIAL

## 2020-02-17 VITALS
WEIGHT: 241 LBS | SYSTOLIC BLOOD PRESSURE: 111 MMHG | OXYGEN SATURATION: 95 % | HEART RATE: 94 BPM | BODY MASS INDEX: 29.35 KG/M2 | HEIGHT: 76 IN | TEMPERATURE: 98.1 F | DIASTOLIC BLOOD PRESSURE: 70 MMHG

## 2020-02-17 PROCEDURE — 99205 OFFICE O/P NEW HI 60 MIN: CPT | Performed by: NURSE PRACTITIONER

## 2020-02-17 RX ORDER — FENOFIBRATE 200 MG/1
CAPSULE ORAL
COMMUNITY
Start: 2020-01-22

## 2020-02-17 RX ORDER — ICOSAPENT ETHYL 1000 MG/1
CAPSULE ORAL
COMMUNITY
Start: 2020-01-27

## 2020-02-17 RX ORDER — GLIMEPIRIDE 2 MG/1
2 TABLET ORAL
COMMUNITY

## 2020-02-17 RX ORDER — CLOPIDOGREL BISULFATE 75 MG/1
75 TABLET ORAL DAILY
Qty: 30 TABLET | Refills: 1 | Status: ON HOLD
Start: 2020-02-17 | End: 2022-04-21 | Stop reason: HOSPADM

## 2020-02-17 NOTE — LETTER
[facility]  [address] [facility] [address]  Phone: [phone]  Fax: [phone] Anisa Childress, JAMAL - NP        February 17, 2020     Patient: Amelie Greene   YOB: 1955   Date of Visit: 2/17/2020       To Whom It May Concern: It is my medical opinion that Barry Caraballo seen in my office today. Can return to work tomorrow 2/18/20. If you have any questions or concerns, please don't hesitate to call.     Sincerely,        JAMAL Lentz NP

## 2021-10-23 NOTE — PROGRESS NOTES
[address] [facility]. Ana Livingston M.D., F.A.C.P. Ingrid Cortez, DNP, APRN, CNS  Gildardo Garcia. Zack Nunes, MSN, APRN-FNP-C  Early Senior MSN, APRN, FNP-C  JUAN MANUEL [address] MSN, APRN, FNP-C  [address] [facility] [address], [address] [place] [address]  Phone: [phone]  Fax: [phone]       Will Parsons is a 62 y.o. right handed male     Patient presents today for evaluation and management of multiple TIAs and chronic daily headaches. Patient presents to his appointment with his wife--patient is a questionable historian however wife is there to provide additional history. Past Medical History:     Past Medical History:   Diagnosis Date    Diabetes mellitus ([address]     Hyperlipidemia     Hypertension     Osteoarthritis     TIA (transient ischemic attack)      Past Surgical History:       Past Surgical History:   Procedure Laterality Date    KNEE SURGERY Left     fracture repair    ROTATOR CUFF REPAIR Bilateral     TOTAL KNEE ARTHROPLASTY Left 02/16/2017    x3      Allergies:       Acetaminophen and Acetaminophen-codeine    Medications:     Prior to Admission medications    Medication Sig Start Date End Date Taking? Authorizing Provider   canagliflozin-metformin HCl (INVOKAMET)  MG Take 50-1,000 mg by mouth   Yes Historical Provider, MD   fenofibrate micronized (LOFIBRA) 200 MG capsule TAKE ONE CAPSULE BY MOUTH ONCE EVERY DAY WITH A MEAL.  1/22/20  Yes Historical Provider, MD   VASCEPA 1 g CAPS capsule TAKE TWO CAPSULES BY MOUTH TWO TIMES A DAY WITH MEALS 1/27/20  Yes Historical Provider, MD   glimepiride (AMARYL) 2 MG tablet Take 2 mg by mouth   Yes Historical Provider, MD   Semaglutide,0.25 or 0.5MG/DOS, 2 MG/1.5ML SOPN Inject 0.5 mg into the skin   Yes Historical Provider, MD   aspirin 81 MG tablet Take 162 mg by mouth daily   Yes Historical Provider, MD   clopidogrel (PLAVIX) 75 MG tablet Take 1 tablet by mouth daily 2/17/20  Yes Keira Subjective:    Lanny Dodd s a 61 year old female who presents today for right wrist pain that started on Thursday. She was lifting some cases of water. The following day the wrist swelled up and \"puffy.\" She was icing the wrist.     Current Medications    AMLODIPINE (NORVASC) 10 MG TABLET    TAKE 1 TABLET BY MOUTH DAILY    ASPIRIN 81 MG TABLET    Take 1 tablet by mouth daily.    BLOOD GLUCOSE MONITORING SUPPL (ONE TOUCH ULTRA 2) W/DEVICE KIT    USE AS DIRECTED    EMPAGLIFLOZIN (JARDIANCE) 25 MG TABLET    Take 1 tablet by mouth daily (before breakfast).    GLIPIZIDE (GLUCOTROL ER) 10 MG 24 HR TABLET    TAKE 1 TABLET BY MOUTH DAILY    HYDROCODONE-ACETAMINOPHEN (NORCO) 5-325 MG PER TABLET    Take 1 tablet by mouth every 8 hours as needed for Pain.    IBUPROFEN (MOTRIN) 800 MG TABLET    TAKE 1 TABLET BY MOUTH EVERY 6 HOURS AS NEEDED FOR PAIN    LANCETS (ONETOUCH DELICA PLUS [id number]) MISC    USE AS DIRECTED THREE TIMES DAILY    LANCETS (ONETOUCH DELICA PLUS [id number]) MISC    USE TO TEST BLOOD SUGARS THREE TIMES DAILY    METFORMIN (GLUCOPHAGE) 1000 MG TABLET    TAKE 1 TABLET BY MOUTH TWICE DAILY WITH MEALS    MULTIPLE VITAMINS-MINERALS (MULTIVITAMIN ADULT PO)        ONE TOUCH ULTRA TEST TEST STRIP    TEST THREE TIMES DAILY AS DIRECTED    ONETOUCH ULTRA TEST STRIP    USE TO TEST BLOOD SUGARS THREE TIMES DAILY AS DIRECTED    ROSUVASTATIN (CRESTOR) 10 MG TABLET    Take 1 tablet by mouth daily.    VITAMIN D, ERGOCALCIFEROL, 1.25 MG (50,000 UNITS) CAPSULE    TAKE 1 CAPSULE BY MOUTH ONCE A WEEK    VITAMIN E 400 UNIT CAPSULE    Take 400 Units by mouth daily.      ALLERGIES:   Allergen Reactions   • Amoxicillin-Pot Clavulanate DIARRHEA   • Amoxicillin PRURITUS      Patient Active Problem List   Diagnosis   • Subserous leiomyoma of uterus   • Chronic rhinitis   • Bacterial vaginosis   • Urinary frequency   • Thrombophlebitis of breast (Mondor's disease)   • Intramural leiomyoma of uterus   • Benign essential hypertension   •  Foot mass   • Cerebral meningioma (CMS/[facility])   • Hyperlipidemia LDL goal <100   • Acute recurrent maxillary sinusitis   • Atherosclerotic heart disease of native coronary artery without angina pectoris   • Type 2 diabetes mellitus without complication, without long-term current use of insulin (CMS/[facility])      Social History     Tobacco Use   • Smoking status: Never Smoker   • Smokeless tobacco: Never Used   Substance Use Topics   • Alcohol use: No     Alcohol/week: 0.0 standard drinks     Comment: rarely   • Drug use: No       Exam:    Vitals:    10/23/21 1559   BP: 122/70   Pulse: 91   Temp: 98.2 °F (36.8 °C)   SpO2: 100%   Weight: 107 kg (236 lb)        GENERAL APPEARANCE:  Well-nourished, in no acute distress.  PSYCHIATRIC: The patient is awake, alert, and oriented x3. Recent and remote memory is intact. Appropriate mood and affect.  SKIN: Warm, dry, and well perfused. Good turgor.    Right wrist: Neg Phalen and tinel's test, + TTP over radial tendon, + finkelstein's test. Mild swelling.  Normal capillary refill.    Plan:  1. Tendonitis of wrist, right  Pt has symptoms of right wrist tendonitis vs deQuervain. I am recommending RICE, use wrist brace with thumb spica, use ibuprofen TID for 7 days. Edu material and red flag symptoms provided. F/U in 1 week with pcp if needed.         including MRI, CTAs and echo was unremarkable for stroke. Patient states he was referred to follow-up with us here locally. In addition to 3 prior TIAs, patient also chronically suffers from neck and back pain and now suffers daily headaches. Patient describes his headaches as radiating from right ear to right shoulder 8 out of 10 in pain at present time. Normally patient's headache starts 4-5/10 in pain and increases throughout the day. Patient describes his pain as stabbing and throbbing. Patient also complains of light sensitivity with intermittent blurred vision and double vision which resolves when one eye is covered. Right upper extremity numbness and tingling is also intermittent during these headaches. Patient has been offered physical therapy in the past however he declines this. Patient has never been presented to neurosurgery from what he is saying. Patient denies nausea, vomiting, speech or swallowing difficulties, or sound sensitivity. In addition to chronic daily headaches and prior TIAs, patient also suffers from hypertension, hyperlipidemia, diabetes mellitus and osteoarthritis. Patient is also had multiple left knee surgeries including total knee replacement and bilateral rotator cuff repair. Patient reports he sleeps 4 to 6 hours each night and wakes up tired. Patient drinks water all day however also drinks 1-2 pots of coffee each day in addition to tea. Patient eats 3 meals a day with snacks at night. Patient does not exercise outside of working which includes carrying heavy pieces of material 10 to 50 pounds.     Objective:       /70 (Site: Right Upper Arm, Position: Sitting, Cuff Size: Medium Adult)   Pulse 94   Temp 98.1 °F (36.7 °C)   Ht 6' 4\" (1.93 m)   Wt 241 lb (109.3 kg)   SpO2 95%   BMI 29.34 kg/m²     General appearance: alert, appears stated age, cooperative with much encouragement and in no distress  Head: normocephalic, without obvious abnormality, atraumatic; left temporal lobe tenderness; extreme tenderness to occipital groove bilaterally right more than left  Eyes: conjunctivae/corneas clear; no drainage  Neck: no carotid bruit, supple, symmetrical, trachea midline and thyroid not enlarged  Back: symmetric, no curvature.  ROM normal.  No trapezius muscle tenderness  Lungs: clear to auscultation bilaterally  Heart: regular rate and rhythm  Abdomen: soft, non-tender; bowel sounds normal  Extremities: normal, atraumatic, no cyanosis or edema  Pulses: 1+ and symmetric  Skin:  color, texture, turgor normal--no rashes or lesions      Mental Status: alert and oriented x 4; laconic and grumpy    Appropriate attention/concentration  Intact fundus of knowledge  Memories intact    Speech: no dysarthria  Language: no aphasias---reading, writing, repetition, and object identification intact    Cranial Nerves:  I: smell  intact   II: visual acuity     II: visual fields Full to finger counting bilaterally and confrontation   II: pupils PERRL   III,VII: ptosis None   III,IV,VI: extraocular muscles  EOMI without nystagmus   V: mastication Normal   V: facial light touch sensation   light touch decreased to left face compared to right   V,VII: corneal reflex     VII: facial muscle function - upper   left upper facial droop   VII: facial muscle function - lower  left lower facial droop   VIII: hearing Normal   IX: soft palate elevation  Normal   IX,X: gag reflex    XI: trapezius strength  4/5 L, 2/5 R   XI: sternocleidomastoid strength 4/5 L, 2/5 R   XI: neck extension strength  Limited    XII: tongue strength  Normal     Motor:  Bilateral upper extremity weakness right more than left-- RUE 2+/5, LUE 4/5  Normal bulk and tone  R pronator drift   No abnormal movements    Sensory:  LT, PP and vibration decreased to left side when compared to the right    Coordination:   FN, FFM and JANE normal  HS normal    Gait:  Slow, cautious, arthritic, antalgic gait    DTR:   Right regions. MRI brain w/o 1/25/2020: No evidence of intracranial hemorrhage, mass effect, or acute infarct. CTA head and neck with CT perfusion 1/24/2020: Atherosclerotic disease without hemodynamically significant stenosis. No proximal occlusion or high-grade stenosis. Absolute mismatch volume: 0 ml  Mismatch ratio: None    CT head 1/24/2020: No acute intracranial hemorrhage. Complete echo 1/25/2020:  · No prior study available for comparison. · Left ventricle is of normal size. · Normal left ventricular diastolic function is present. · There is normal left ventricular wall motion. · The left ventricular systolic function is normal. The left ventricular     ejection fraction is 65% (normal LVEF is 55%-74%). · The right ventricle has normal size, wall thickness and systolic      function. · No significant valve disease    All labs and images were personally reviewed at the time of this visit    Assessment:     Cryptogenic TIA   Previously on aspirin 162 mg and Lipitor 40 mg--- will add Plavix   Atherosclerotic disease without hemodynamically significant stenosis on CTA    Chronic daily headaches--bilateral occipital neuralgia right more than left in addition to tension type   MRI cervical spine proves several areas of severe right neuroforaminal narrowing, severe bilateral   neuroforaminal narrowing and degenerative changes.    Extreme tenderness to occipital groove right more than left in addition to left temporal pain   Patient has tried muscle relaxants in the past with no improvement   Patient declines physical therapy   I believe patient will benefit from occipital nerve blocks from pain management    Lifestyle modifications   chronic sleep deprivation in addition to excessive caffeine use is provoking daily headaches   Better sleep hygiene recommended in addition to over-the-counter sleep aid   Significant reduction in caffeine use recommended    Plan:     Education and encouragement provided today during visit  Referral to pain management for occipital nerve blocks  Plavix 75 mg daily ordered  Lifestyle modifications as listed above  Better sleep hygiene  Reurn to office in 3 months  Call with any issues      JAMAL Louise NP-C   5:33 PM  2/17/2020    I spent 65 minutes with this patient obtaining the HPI and discussing the exam with greater than 50% of the time providing counseling and education on medications and other treatment plans. All questions were answered prior to leaving my office.

## 2022-04-18 ENCOUNTER — APPOINTMENT (OUTPATIENT)
Dept: CT IMAGING | Age: 67
DRG: 480 | End: 2022-04-18
Payer: MEDICARE

## 2022-04-18 ENCOUNTER — HOSPITAL ENCOUNTER (INPATIENT)
Age: 67
LOS: 3 days | Discharge: HOME HEALTH | DRG: 480 | End: 2022-04-21
Attending: INTERNAL MEDICINE | Admitting: INTERNAL MEDICINE
Payer: MEDICARE

## 2022-04-18 ENCOUNTER — APPOINTMENT (OUTPATIENT)
Dept: GENERAL RADIOLOGY | Age: 67
DRG: 480 | End: 2022-04-18
Payer: MEDICARE
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
## 2022-04-18 DIAGNOSIS — M25.562 ACUTE PAIN OF LEFT KNEE: ICD-10-CM

## 2022-04-18 DIAGNOSIS — S72.001A CLOSED FRACTURE OF NECK OF RIGHT FEMUR, INITIAL ENCOUNTER (HCC): Primary | ICD-10-CM

## 2022-04-18 LAB
ANION GAP SERPL CALCULATED.3IONS-SCNC: 11 MMOL/L (ref 7–16)
APTT: 28.5 SEC (ref 24.5–35.1)
BASOPHILS ABSOLUTE: 0.03 E9/L (ref 0–0.2)
BASOPHILS RELATIVE PERCENT: 0.6 % (ref 0–2)
BUN BLDV-MCNC: 14 MG/DL (ref 6–23)
CALCIUM SERPL-MCNC: 9.8 MG/DL (ref 8.6–10.2)
CHLORIDE BLD-SCNC: 104 MMOL/L (ref 98–107)
CO2: 23 MMOL/L (ref 22–29)
CREAT SERPL-MCNC: 0.7 MG/DL (ref 0.7–1.2)
EOSINOPHILS ABSOLUTE: 0.21 E9/L (ref 0.05–0.5)
EOSINOPHILS RELATIVE PERCENT: 3.9 % (ref 0–6)
GFR AFRICAN AMERICAN: >60
GFR NON-AFRICAN AMERICAN: >60 ML/MIN/1.73
GLUCOSE BLD-MCNC: 155 MG/DL (ref 74–99)
HCT VFR BLD CALC: 46.3 % (ref 37–54)
HEMOGLOBIN: 15.6 G/DL (ref 12.5–16.5)
IMMATURE GRANULOCYTES #: 0.02 E9/L
IMMATURE GRANULOCYTES %: 0.4 % (ref 0–5)
INR BLD: 1
LYMPHOCYTES ABSOLUTE: 1.68 E9/L (ref 1.5–4)
LYMPHOCYTES RELATIVE PERCENT: 31.3 % (ref 20–42)
MCH RBC QN AUTO: 31 PG (ref 26–35)
MCHC RBC AUTO-ENTMCNC: 33.7 % (ref 32–34.5)
MCV RBC AUTO: 92 FL (ref 80–99.9)
METER GLUCOSE: 221 MG/DL (ref 74–99)
MONOCYTES ABSOLUTE: 0.39 E9/L (ref 0.1–0.95)
MONOCYTES RELATIVE PERCENT: 7.3 % (ref 2–12)
NEUTROPHILS ABSOLUTE: 3.03 E9/L (ref 1.8–7.3)
NEUTROPHILS RELATIVE PERCENT: 56.5 % (ref 43–80)
PDW BLD-RTO: 13.3 FL (ref 11.5–15)
PLATELET # BLD: 227 E9/L (ref 130–450)
PMV BLD AUTO: 9 FL (ref 7–12)
POTASSIUM REFLEX MAGNESIUM: 4.8 MMOL/L (ref 3.5–5)
PROTHROMBIN TIME: 11.3 SEC (ref 9.3–12.4)
RBC # BLD: 5.03 E12/L (ref 3.8–5.8)
SODIUM BLD-SCNC: 138 MMOL/L (ref 132–146)
WBC # BLD: 5.4 E9/L (ref 4.5–11.5)

## 2022-04-18 PROCEDURE — 6370000000 HC RX 637 (ALT 250 FOR IP): Performed by: PHYSICIAN ASSISTANT

## 2022-04-18 PROCEDURE — 82962 GLUCOSE BLOOD TEST: CPT

## 2022-04-18 PROCEDURE — 1200000000 HC SEMI PRIVATE

## 2022-04-18 PROCEDURE — 73562 X-RAY EXAM OF KNEE 3: CPT

## 2022-04-18 PROCEDURE — 93005 ELECTROCARDIOGRAM TRACING: CPT | Performed by: PHYSICIAN ASSISTANT

## 2022-04-18 PROCEDURE — 85610 PROTHROMBIN TIME: CPT

## 2022-04-18 PROCEDURE — 85025 COMPLETE CBC W/AUTO DIFF WBC: CPT

## 2022-04-18 PROCEDURE — 71046 X-RAY EXAM CHEST 2 VIEWS: CPT

## 2022-04-18 PROCEDURE — 73610 X-RAY EXAM OF ANKLE: CPT

## 2022-04-18 PROCEDURE — 6370000000 HC RX 637 (ALT 250 FOR IP): Performed by: INTERNAL MEDICINE

## 2022-04-18 PROCEDURE — 73521 X-RAY EXAM HIPS BI 2 VIEWS: CPT

## 2022-04-18 PROCEDURE — 80048 BASIC METABOLIC PNL TOTAL CA: CPT

## 2022-04-18 PROCEDURE — 99285 EMERGENCY DEPT VISIT HI MDM: CPT

## 2022-04-18 PROCEDURE — 99222 1ST HOSP IP/OBS MODERATE 55: CPT | Performed by: INTERNAL MEDICINE

## 2022-04-18 PROCEDURE — 85730 THROMBOPLASTIN TIME PARTIAL: CPT

## 2022-04-18 PROCEDURE — 73700 CT LOWER EXTREMITY W/O DYE: CPT

## 2022-04-18 PROCEDURE — 2580000003 HC RX 258: Performed by: GENERAL ACUTE CARE HOSPITAL

## 2022-04-18 RX ORDER — ASPIRIN 325 MG
162 TABLET ORAL DAILY
Status: DISCONTINUED | OUTPATIENT
Start: 2022-04-20 | End: 2022-04-21 | Stop reason: HOSPADM

## 2022-04-18 RX ORDER — GLIPIZIDE 5 MG/1
5 TABLET ORAL
Status: DISCONTINUED | OUTPATIENT
Start: 2022-04-18 | End: 2022-04-19

## 2022-04-18 RX ORDER — SEMAGLUTIDE 1.34 MG/ML
0.5 INJECTION, SOLUTION SUBCUTANEOUS WEEKLY
COMMUNITY
Start: 2022-01-12

## 2022-04-18 RX ORDER — OXYCODONE AND ACETAMINOPHEN 10; 325 MG/1; MG/1
1 TABLET ORAL ONCE
Status: COMPLETED | OUTPATIENT
Start: 2022-04-18 | End: 2022-04-18

## 2022-04-18 RX ORDER — EMPAGLIFLOZIN 25 MG/1
25 TABLET, FILM COATED ORAL DAILY
Status: ON HOLD | COMMUNITY
End: 2022-04-21 | Stop reason: HOSPADM

## 2022-04-18 RX ORDER — OMEGA-3-ACID ETHYL ESTERS 1 G/1
1 CAPSULE, LIQUID FILLED ORAL 2 TIMES DAILY
Status: DISCONTINUED | OUTPATIENT
Start: 2022-04-18 | End: 2022-04-21 | Stop reason: HOSPADM

## 2022-04-18 RX ORDER — SODIUM CHLORIDE 0.9 % (FLUSH) 0.9 %
5-40 SYRINGE (ML) INJECTION EVERY 12 HOURS SCHEDULED
Status: DISCONTINUED | OUTPATIENT
Start: 2022-04-18 | End: 2022-04-19 | Stop reason: HOSPADM

## 2022-04-18 RX ORDER — SODIUM CHLORIDE 9 MG/ML
25 INJECTION, SOLUTION INTRAVENOUS PRN
Status: DISCONTINUED | OUTPATIENT
Start: 2022-04-18 | End: 2022-04-19 | Stop reason: HOSPADM

## 2022-04-18 RX ORDER — MORPHINE SULFATE 4 MG/ML
4 INJECTION, SOLUTION INTRAMUSCULAR; INTRAVENOUS
Status: DISCONTINUED | OUTPATIENT
Start: 2022-04-18 | End: 2022-04-19 | Stop reason: SDUPTHER

## 2022-04-18 RX ORDER — DIPHENHYDRAMINE HYDROCHLORIDE 50 MG/ML
25 INJECTION INTRAMUSCULAR; INTRAVENOUS EVERY 6 HOURS PRN
Status: DISCONTINUED | OUTPATIENT
Start: 2022-04-18 | End: 2022-04-21 | Stop reason: HOSPADM

## 2022-04-18 RX ORDER — CLOPIDOGREL BISULFATE 75 MG/1
75 TABLET ORAL DAILY
Status: DISCONTINUED | OUTPATIENT
Start: 2022-04-18 | End: 2022-04-20

## 2022-04-18 RX ORDER — ONDANSETRON 2 MG/ML
4 INJECTION INTRAMUSCULAR; INTRAVENOUS EVERY 6 HOURS PRN
Status: DISCONTINUED | OUTPATIENT
Start: 2022-04-18 | End: 2022-04-21 | Stop reason: HOSPADM

## 2022-04-18 RX ORDER — ATORVASTATIN CALCIUM 40 MG/1
40 TABLET, FILM COATED ORAL NIGHTLY
Status: DISCONTINUED | OUTPATIENT
Start: 2022-04-18 | End: 2022-04-21 | Stop reason: HOSPADM

## 2022-04-18 RX ORDER — SODIUM CHLORIDE 0.9 % (FLUSH) 0.9 %
5-40 SYRINGE (ML) INJECTION PRN
Status: DISCONTINUED | OUTPATIENT
Start: 2022-04-18 | End: 2022-04-19 | Stop reason: HOSPADM

## 2022-04-18 RX ADMIN — OXYCODONE AND ACETAMINOPHEN 1 TABLET: 10; 325 TABLET ORAL at 18:13

## 2022-04-18 RX ADMIN — OXYCODONE AND ACETAMINOPHEN 1 TABLET: 10; 325 TABLET ORAL at 20:21

## 2022-04-18 RX ADMIN — INSULIN LISPRO 2 UNITS: 100 INJECTION, SOLUTION INTRAVENOUS; SUBCUTANEOUS at 22:42

## 2022-04-18 RX ADMIN — Medication 10 ML: at 22:41

## 2022-04-18 ASSESSMENT — PAIN SCALES - GENERAL
PAINLEVEL_OUTOF10: 6
PAINLEVEL_OUTOF10: 6
PAINLEVEL_OUTOF10: 3
PAINLEVEL_OUTOF10: 6

## 2022-04-18 ASSESSMENT — PAIN DESCRIPTION - LOCATION: LOCATION: HIP

## 2022-04-18 ASSESSMENT — PAIN DESCRIPTION - PROGRESSION: CLINICAL_PROGRESSION: GRADUALLY WORSENING

## 2022-04-18 ASSESSMENT — PAIN DESCRIPTION - PAIN TYPE: TYPE: ACUTE PAIN

## 2022-04-18 ASSESSMENT — PAIN DESCRIPTION - FREQUENCY: FREQUENCY: CONTINUOUS

## 2022-04-18 ASSESSMENT — PAIN DESCRIPTION - DESCRIPTORS: DESCRIPTORS: ACHING

## 2022-04-18 ASSESSMENT — PAIN DESCRIPTION - ONSET: ONSET: ON-GOING

## 2022-04-18 ASSESSMENT — PAIN DESCRIPTION - ORIENTATION: ORIENTATION: RIGHT

## 2022-04-18 ASSESSMENT — PAIN - FUNCTIONAL ASSESSMENT: PAIN_FUNCTIONAL_ASSESSMENT: PREVENTS OR INTERFERES SOME ACTIVE ACTIVITIES AND ADLS

## 2022-04-18 NOTE — PROGRESS NOTES
Database initiated pharmacy verified. Patient is A&O from home with wife. His wife will bring the list of medications tonight he does not know them off hand.

## 2022-04-18 NOTE — ED PROVIDER NOTES
[address] [facility]  Department of Emergency Medicine   ED  Encounter Note  Admit Date/RoomTime: 2022  3:45 PM  ED Room: 36/36    NAME: Cheyenne Sever  : 1955  MRN: [id number]     Chief Complaint:  Fall (mechanical fall 4 days ago. rt ankle,rt knee,rt hip and groin pain)    History of Present Illness       Cheyenne Sever is a 73 y.o. old male who presents to the emergency department by private vehicle, for traumatic right hip pain which occured 4 day(s) prior to arrival.  The pain was result of mechanical fall sustained 4 days prior to arrival while ambulating within his kitchen with his walker when he reports that his \"right knee gave out\" which caused him to fall onto the ground striking his right knee, twisting his right ankle and landing ultimately on the lateral aspect of his right hip. Patient reports that he was able to ambulate off of the ground after approximately 20 minutes under his own free well and with the utilization of his cane. Since onset the symptoms have been gradually worsening. Patient has no prior history of pain/injury with regards to today's visit. His pain is aggraveated by pressure on or palpation of painful area, weight bearing or walking, relieved by nothing and associated with groin pain. He denies any head injury, headache, loss of consciousness, confusion, dizziness, neck pain, chest pain, abdominal pain, numbness, weakness, blurred vision, nausea, vomiting, fever, chills, wounds or rash. Tetanus Status: unknown. Patient reports that he did not present initially as he felt that he did not severely injure himself. Patient reports that he has been not of been evaluated by provider since fall occurred and that he is presenting today as the pain is gradually worsening. Patient denies any bowel or bladder incontinence, saddle anesthesia, or urinary symptoms. Patient denies blood thinner.   ROS   Pertinent positives and negatives are stated within HPI, all other systems reviewed and are negative. Past Medical History:  has a past medical history of Diabetes mellitus ([address] Hyperlipidemia, Hypertension, Osteoarthritis, and TIA (transient ischemic attack). Surgical History:  has a past surgical history that includes knee surgery (Left); Rotator cuff repair (Bilateral); and Total knee arthroplasty (Left, 02/16/2017). Social History:  reports that he has quit smoking. He has never used smokeless tobacco. He reports current alcohol use. He reports that he does not use drugs. Family History: family history is not on file. Allergies: Acetaminophen-codeine    Physical Exam   Oxygen Saturation Interpretation: Normal.        ED Triage Vitals [04/18/22 1534]   BP Temp Temp src Pulse Resp SpO2 Height Weight   (!) 124/96 97.8 °F (36.6 °C) -- 97 16 94 % 6' 4\" (1.93 m) 233 lb (105.7 kg)         Constitutional:  Alert. Development consistent with age. Head:  Atraumatic, without temporal or scalp tenderness. Eyes:  PERRL, EOMI. No periorbital ecchymoses. Conjunctiva: normal.  Ears:  External ears without lesions. Throat:  Pharynx without lesions. Airway patient. Neck:  Supple. Nontender. Chest:  Symmetrical without visible rash or tenderness. Respiratory:  Clear to auscultation and breath sounds equal.  CV:  Regular rate and rhythm, normal heart sounds, without pathological murmurs. GI:  Abdmen Soft, nontender. No firm or pulsatile mass. No abrasions, ecchymoses, or lacerations. Back:  No costovertebral, paravertebral, intervertebral, or vertebral tenderness or spasm. Musculoskeletal:  Pelvis:  Right. Tenderness: mild. Stability:  stable to palpation. Right Hip:         Tenderness:  mild. Swelling: None. Deformity: no deformity observed/palpated. ROM: diminished range with pain. Skin: no wounds or erythema. Neurovascular: Motor deficit: none. Sensory deficit: none. Pulse deficit: none. Capillary refill: normal.       Calf Tenderness:  No Bilateral.          Edema:  none Both lower extremity(s). Right Knee: global            Tenderness:  mild. .              Swelling/Effusion: Mild swelling. Deformity: no deformity observed/palpated. ROM: full range with pain. Skin:  no wounds or erythema. Joint(s) Below: ankle. Tenderness:  none. Swelling: No.              Deformity: no deformity observed/palpated. ROM: full range of motion. Skin:  no wounds, erythema, or swelling. Gait:  limp due to affected limb. Integument:  No abrasions, ecchymoses, or lacerations unless noted elsewhere. Neurological:  Orientation age-appropriate. Motor functions intact.     Lab / Imaging Results   (All laboratory and radiology results have been personally reviewed by myself)  Labs:  Results for orders placed or performed during the hospital encounter of 04/18/22   CBC with Auto Differential   Result Value Ref Range    WBC 5.4 4.5 - 11.5 E9/L    RBC 5.03 3.80 - 5.80 E12/L    Hemoglobin 15.6 12.5 - 16.5 g/dL    Hematocrit 46.3 37.0 - 54.0 %    MCV 92.0 80.0 - 99.9 fL    MCH 31.0 26.0 - 35.0 pg    MCHC 33.7 32.0 - 34.5 %    RDW 13.3 11.5 - 15.0 fL    Platelets 752 642 - 272 E9/L    MPV 9.0 7.0 - 12.0 fL    Neutrophils % 56.5 43.0 - 80.0 %    Immature Granulocytes % 0.4 0.0 - 5.0 %    Lymphocytes % 31.3 20.0 - 42.0 %    Monocytes % 7.3 2.0 - 12.0 %    Eosinophils % 3.9 0.0 - 6.0 %    Basophils % 0.6 0.0 - 2.0 %    Neutrophils Absolute 3.03 1.80 - 7.30 E9/L    Immature Granulocytes # 0.02 E9/L    Lymphocytes Absolute 1.68 1.50 - 4.00 E9/L    Monocytes Absolute 0.39 0.10 - 0.95 E9/L    Eosinophils Absolute 0.21 0.05 - 0.50 E9/L    Basophils Absolute 0.03 0.00 - 0.20 W5/S   Basic Metabolic Panel w/ Reflex to MG   Result Value Ref Range    Sodium 138 132 - 146 mmol/L    Potassium reflex Magnesium 4.8 3.5 - 5.0 mmol/L    Chloride 104 98 - 107 mmol/L    CO2 23 22 - 29 mmol/L    Anion Gap 11 7 - 16 mmol/L    Glucose 155 (H) 74 - 99 mg/dL    BUN 14 6 - 23 mg/dL    CREATININE 0.7 0.7 - 1.2 mg/dL    GFR Non-African American >60 >=60 mL/min/1.73    GFR African American >60     Calcium 9.8 8.6 - 10.2 mg/dL   APTT   Result Value Ref Range    aPTT 28.5 24.5 - 35.1 sec   Protime-INR   Result Value Ref Range    Protime 11.3 9.3 - 12.4 sec    INR 1.0      Imaging: All Radiology results interpreted by Radiologist unless otherwise noted. CT HIP RIGHT WO CONTRAST   Final Result   Mildly displaced subcapital right femoral neck fracture. XR CHEST (2 VW)   Final Result   No acute process. XR ANKLE RIGHT (MIN 3 VIEWS)   Final Result   No acute abnormality of the ankle. Small plantar calcaneal spur. XR HIP BILATERAL W AP PELVIS (2 VIEWS)   Final Result   Mildly displaced subcapital right femoral fracture. CT scan of the right hip   can be performed for further, more detailed evaluation. Osteo-degenerative   changes, as described above. XR KNEE RIGHT (3 VIEWS)   Final Result   Moderate tricompartmental osteoarthritis. No acute bony abnormalities. Question small joint effusion. ED Course / Medical Decision Making     Medications   oxyCODONE-acetaminophen (PERCOCET)  MG per tablet 1 tablet (has no administration in time range)   oxyCODONE-acetaminophen (PERCOCET)  MG per tablet 1 tablet (1 tablet Oral Given 4/18/22 1813)        Re-examination:  4/18/22       Time: 5:20 PM   Discussed results of imaging with the patient at this time as well as possible plan for admit to hospital with orthopedic surgical correction tomorrow pending recommendation from orthopedic doctor on-call. Patient states understandable and agreeable.     Consult(s):   IP CONSULT TO ORTHOPEDIC SURGERY    Procedure(s):  None    MDM:   Imaging was obtained based on moderate suspicion for fracture / bony abnormality as per history/physical findings. X-ray imaging of bilateral pelvis demonstrated a mildly displaced subcapital right femoral fracture as read by radiology. X-ray imaging of right ankle demonstrated no acute abnormality of the ankle with small plantar calcaneal spurring as noted by radiology. X-ray imaging of the right knee demonstrated moderate tricompartmental osteoarthritis with questionable small joint effusion without acute bony abnormalities as read by radiology. Consult was placed out to orthopedic doctor on-call, Dr. Brayden Franklin, for recommendation on further plan at this point. Dr. Brayden Franklin recommended admit to [place] [address] with protocol blood work, EKG, and chest x-ray with probable surgical correction tentatively scheduled for tomorrow morning. Patient was notified of this and all questions were answered. Patient agreeable. Consult was placed out to [facility] hospitalist for admit. I spoke with Dr. Giuliana Cintron for admit who was agreeable. Admit order was placed. Patient stable upon transfer. Plan of Care/Counseling:  KAYLYN Ryan reviewed today's visit with the patient in addition to providing specific details for the plan of care and counseling regarding the diagnosis and prognosis. Questions are answered at this time and are agreeable with the plan. Assessment      1. Closed fracture of neck of right femur, initial encounter ([facility] [address]    2. Fall, initial encounter    3. Acute pain of left knee      Plan   Admit to surg. Patient condition is stable    New Medications     New Prescriptions    No medications on file     Electronically signed by KAYLYN Ryan   DD: 4/18/22  **This report was transcribed using voice recognition software. Every effort was made to ensure accuracy; however, inadvertent computerized transcription errors may be present.   END OF ED PROVIDER NOTE        KAYLYN Ryan  04/18/22 [address] [place] [address], [facility]  04/18/22 3063

## 2022-04-18 NOTE — ED NOTES
Department of Emergency Medicine    FIRST PROVIDER TRIAGE NOTE             Independent MLP           4/18/22  3:35 PM EDT    Date of Encounter: 4/18/22   MRN: [id number]    Vitals:    04/18/22 1534   BP: (!) 124/96   Pulse: 97   Resp: 16   Temp: 97.8 °F (36.6 °C)   SpO2: 94%   Weight: 233 lb (105.7 kg)   Height: 6' 4\" (1.93 m)      HPI: Viry Easley is a 73 y.o. male who presents to the ED for Fall (mechanical fall 4 days ago. rt ankle,rt knee,rt hip and groin pain)  Denies hitting head   Patient states left knee \"gave out\"     ROS: Negative for cp, sob, headache or dizziness. Physical Exam:   Gen Appearance/Constitutional: alert  CV: regular rate  Musculoskeletal: TTP right hip/groin, knee, and ankle, intact sensations distally      Initial Plan of Care: All treatment areas with department are currently occupied. Plan to order/Initiate the following while awaiting opening in ED: imaging studies.     Initial Plan of Care: Initiate Treatment-Testing, Proceed toTreatment Area When Bed Available for ED Attending/MLP to Continue Care    Electronically signed by Ryan Tyson PA-C   DD: 4/18/22       Ryan Tyson PA-C  04/18/22 0456

## 2022-04-19 ENCOUNTER — ANESTHESIA (OUTPATIENT)
Dept: OPERATING ROOM | Age: 67
DRG: 480 | End: 2022-04-19
Payer: MEDICARE

## 2022-04-19 ENCOUNTER — ANESTHESIA EVENT (OUTPATIENT)
Dept: OPERATING ROOM | Age: 67
DRG: 480 | End: 2022-04-19
Payer: MEDICARE

## 2022-04-19 ENCOUNTER — APPOINTMENT (OUTPATIENT)
Dept: GENERAL RADIOLOGY | Age: 67
DRG: 480 | End: 2022-04-19
Payer: MEDICARE

## 2022-04-19 ENCOUNTER — APPOINTMENT (OUTPATIENT)
Dept: CT IMAGING | Age: 67
DRG: 480 | End: 2022-04-19
Payer: MEDICARE

## 2022-04-19 VITALS
SYSTOLIC BLOOD PRESSURE: 114 MMHG | RESPIRATION RATE: 24 BRPM | TEMPERATURE: 92.5 F | DIASTOLIC BLOOD PRESSURE: 67 MMHG | OXYGEN SATURATION: 93 %

## 2022-04-19 LAB
ALBUMIN SERPL-MCNC: 4.3 G/DL (ref 3.5–5.2)
ALP BLD-CCNC: 72 U/L (ref 40–129)
ALT SERPL-CCNC: 79 U/L (ref 0–40)
ANION GAP SERPL CALCULATED.3IONS-SCNC: 14 MMOL/L (ref 7–16)
ANION GAP SERPL CALCULATED.3IONS-SCNC: 16 MMOL/L (ref 7–16)
APTT: 27.5 SEC (ref 24.5–35.1)
AST SERPL-CCNC: 92 U/L (ref 0–39)
B.E.: -4.7 MMOL/L (ref -3–3)
BASOPHILS ABSOLUTE: 0.02 E9/L (ref 0–0.2)
BASOPHILS ABSOLUTE: 0.04 E9/L (ref 0–0.2)
BASOPHILS RELATIVE PERCENT: 0.2 % (ref 0–2)
BASOPHILS RELATIVE PERCENT: 0.6 % (ref 0–2)
BILIRUB SERPL-MCNC: 0.6 MG/DL (ref 0–1.2)
BUN BLDV-MCNC: 16 MG/DL (ref 6–23)
BUN BLDV-MCNC: 17 MG/DL (ref 6–23)
CALCIUM SERPL-MCNC: 10.1 MG/DL (ref 8.6–10.2)
CALCIUM SERPL-MCNC: 9.7 MG/DL (ref 8.6–10.2)
CHLORIDE BLD-SCNC: 101 MMOL/L (ref 98–107)
CHLORIDE BLD-SCNC: 103 MMOL/L (ref 98–107)
CO2: 19 MMOL/L (ref 22–29)
CO2: 23 MMOL/L (ref 22–29)
COHB: 1.2 % (ref 0–1.5)
CREAT SERPL-MCNC: 0.8 MG/DL (ref 0.7–1.2)
CREAT SERPL-MCNC: 0.9 MG/DL (ref 0.7–1.2)
CRITICAL: ABNORMAL
D DIMER: 829 NG/ML DDU
DATE ANALYZED: ABNORMAL
DATE OF COLLECTION: ABNORMAL
EKG ATRIAL RATE: 85 BPM
EKG P AXIS: 25 DEGREES
EKG P-R INTERVAL: 158 MS
EKG Q-T INTERVAL: 342 MS
EKG QRS DURATION: 72 MS
EKG QTC CALCULATION (BAZETT): 406 MS
EKG R AXIS: 29 DEGREES
EKG T AXIS: 42 DEGREES
EKG VENTRICULAR RATE: 85 BPM
EOSINOPHILS ABSOLUTE: 0.03 E9/L (ref 0.05–0.5)
EOSINOPHILS ABSOLUTE: 0.31 E9/L (ref 0.05–0.5)
EOSINOPHILS RELATIVE PERCENT: 0.4 % (ref 0–6)
EOSINOPHILS RELATIVE PERCENT: 4.4 % (ref 0–6)
GFR AFRICAN AMERICAN: >60
GFR AFRICAN AMERICAN: >60
GFR NON-AFRICAN AMERICAN: >60 ML/MIN/1.73
GFR NON-AFRICAN AMERICAN: >60 ML/MIN/1.73
GLUCOSE BLD-MCNC: 138 MG/DL (ref 74–99)
GLUCOSE BLD-MCNC: 226 MG/DL (ref 74–99)
HCO3: 17.3 MMOL/L (ref 22–26)
HCT VFR BLD CALC: 43.4 % (ref 37–54)
HCT VFR BLD CALC: 44.7 % (ref 37–54)
HEMOGLOBIN: 14.5 G/DL (ref 12.5–16.5)
HEMOGLOBIN: 15 G/DL (ref 12.5–16.5)
HHB: 7.3 % (ref 0–5)
IMMATURE GRANULOCYTES #: 0.03 E9/L
IMMATURE GRANULOCYTES #: 0.05 E9/L
IMMATURE GRANULOCYTES %: 0.4 % (ref 0–5)
IMMATURE GRANULOCYTES %: 0.6 % (ref 0–5)
INR BLD: 1.1
LAB: ABNORMAL
LYMPHOCYTES ABSOLUTE: 0.7 E9/L (ref 1.5–4)
LYMPHOCYTES ABSOLUTE: 2.49 E9/L (ref 1.5–4)
LYMPHOCYTES RELATIVE PERCENT: 35.4 % (ref 20–42)
LYMPHOCYTES RELATIVE PERCENT: 8.5 % (ref 20–42)
Lab: ABNORMAL
MCH RBC QN AUTO: 30.4 PG (ref 26–35)
MCH RBC QN AUTO: 30.7 PG (ref 26–35)
MCHC RBC AUTO-ENTMCNC: 33.4 % (ref 32–34.5)
MCHC RBC AUTO-ENTMCNC: 33.6 % (ref 32–34.5)
MCV RBC AUTO: 90.5 FL (ref 80–99.9)
MCV RBC AUTO: 91.8 FL (ref 80–99.9)
METER GLUCOSE: 142 MG/DL (ref 74–99)
METER GLUCOSE: 142 MG/DL (ref 74–99)
METER GLUCOSE: 144 MG/DL (ref 74–99)
METER GLUCOSE: 163 MG/DL (ref 74–99)
METER GLUCOSE: 190 MG/DL (ref 74–99)
METER GLUCOSE: 208 MG/DL (ref 74–99)
METHB: 0.3 % (ref 0–1.5)
MODE: ABNORMAL
MONOCYTES ABSOLUTE: 0.13 E9/L (ref 0.1–0.95)
MONOCYTES ABSOLUTE: 0.55 E9/L (ref 0.1–0.95)
MONOCYTES RELATIVE PERCENT: 1.6 % (ref 2–12)
MONOCYTES RELATIVE PERCENT: 7.8 % (ref 2–12)
NEUTROPHILS ABSOLUTE: 3.61 E9/L (ref 1.8–7.3)
NEUTROPHILS ABSOLUTE: 7.27 E9/L (ref 1.8–7.3)
NEUTROPHILS RELATIVE PERCENT: 51.4 % (ref 43–80)
NEUTROPHILS RELATIVE PERCENT: 88.7 % (ref 43–80)
O2 CONTENT: 19.1 ML/DL
O2 SATURATION: 92.6 % (ref 92–98.5)
O2HB: 91.2 % (ref 94–97)
OPERATOR ID: 1741
PATIENT TEMP: 37 C
PCO2: 25.2 MMHG (ref 35–45)
PDW BLD-RTO: 13.2 FL (ref 11.5–15)
PDW BLD-RTO: 13.2 FL (ref 11.5–15)
PH BLOOD GAS: 7.46 (ref 7.35–7.45)
PLATELET # BLD: 234 E9/L (ref 130–450)
PLATELET # BLD: 249 E9/L (ref 130–450)
PMV BLD AUTO: 8.8 FL (ref 7–12)
PMV BLD AUTO: 9.1 FL (ref 7–12)
PO2: 62.1 MMHG (ref 75–100)
POTASSIUM REFLEX MAGNESIUM: 4.2 MMOL/L (ref 3.5–5)
POTASSIUM SERPL-SCNC: 4.5 MMOL/L (ref 3.5–5)
PROTHROMBIN TIME: 11.7 SEC (ref 9.3–12.4)
RBC # BLD: 4.73 E12/L (ref 3.8–5.8)
RBC # BLD: 4.94 E12/L (ref 3.8–5.8)
SODIUM BLD-SCNC: 136 MMOL/L (ref 132–146)
SODIUM BLD-SCNC: 140 MMOL/L (ref 132–146)
SOURCE, BLOOD GAS: ABNORMAL
THB: 14.9 G/DL (ref 11.5–16.5)
TIME ANALYZED: 2044
TOTAL PROTEIN: 7.3 G/DL (ref 6.4–8.3)
VITAMIN D 25-HYDROXY: 26 NG/ML (ref 30–100)
WBC # BLD: 7 E9/L (ref 4.5–11.5)
WBC # BLD: 8.2 E9/L (ref 4.5–11.5)

## 2022-04-19 PROCEDURE — 2500000003 HC RX 250 WO HCPCS: Performed by: GENERAL ACUTE CARE HOSPITAL

## 2022-04-19 PROCEDURE — 6360000002 HC RX W HCPCS: Performed by: INTERNAL MEDICINE

## 2022-04-19 PROCEDURE — 6360000002 HC RX W HCPCS: Performed by: NURSE ANESTHETIST, CERTIFIED REGISTERED

## 2022-04-19 PROCEDURE — 85730 THROMBOPLASTIN TIME PARTIAL: CPT

## 2022-04-19 PROCEDURE — 2580000003 HC RX 258: Performed by: GENERAL ACUTE CARE HOSPITAL

## 2022-04-19 PROCEDURE — 36415 COLL VENOUS BLD VENIPUNCTURE: CPT

## 2022-04-19 PROCEDURE — 85610 PROTHROMBIN TIME: CPT

## 2022-04-19 PROCEDURE — 85025 COMPLETE CBC W/AUTO DIFF WBC: CPT

## 2022-04-19 PROCEDURE — 82805 BLOOD GASES W/O2 SATURATION: CPT

## 2022-04-19 PROCEDURE — 82962 GLUCOSE BLOOD TEST: CPT

## 2022-04-19 PROCEDURE — 3600000003 HC SURGERY LEVEL 3 BASE: Performed by: GENERAL ACUTE CARE HOSPITAL

## 2022-04-19 PROCEDURE — 6360000002 HC RX W HCPCS: Performed by: ANESTHESIOLOGY

## 2022-04-19 PROCEDURE — 2580000003 HC RX 258: Performed by: INTERNAL MEDICINE

## 2022-04-19 PROCEDURE — 7100000001 HC PACU RECOVERY - ADDTL 15 MIN: Performed by: GENERAL ACUTE CARE HOSPITAL

## 2022-04-19 PROCEDURE — 1200000000 HC SEMI PRIVATE

## 2022-04-19 PROCEDURE — 82306 VITAMIN D 25 HYDROXY: CPT

## 2022-04-19 PROCEDURE — 6370000000 HC RX 637 (ALT 250 FOR IP): Performed by: GENERAL ACUTE CARE HOSPITAL

## 2022-04-19 PROCEDURE — C1769 GUIDE WIRE: HCPCS | Performed by: GENERAL ACUTE CARE HOSPITAL

## 2022-04-19 PROCEDURE — 3700000001 HC ADD 15 MINUTES (ANESTHESIA): Performed by: GENERAL ACUTE CARE HOSPITAL

## 2022-04-19 PROCEDURE — 85378 FIBRIN DEGRADE SEMIQUANT: CPT

## 2022-04-19 PROCEDURE — 70450 CT HEAD/BRAIN W/O DYE: CPT

## 2022-04-19 PROCEDURE — 3700000000 HC ANESTHESIA ATTENDED CARE: Performed by: GENERAL ACUTE CARE HOSPITAL

## 2022-04-19 PROCEDURE — 6370000000 HC RX 637 (ALT 250 FOR IP): Performed by: INTERNAL MEDICINE

## 2022-04-19 PROCEDURE — 3209999900 FLUORO FOR SURGICAL PROCEDURES

## 2022-04-19 PROCEDURE — 84145 PROCALCITONIN (PCT): CPT

## 2022-04-19 PROCEDURE — 6360000002 HC RX W HCPCS

## 2022-04-19 PROCEDURE — 3600000013 HC SURGERY LEVEL 3 ADDTL 15MIN: Performed by: GENERAL ACUTE CARE HOSPITAL

## 2022-04-19 PROCEDURE — 99291 CRITICAL CARE FIRST HOUR: CPT | Performed by: INTERNAL MEDICINE

## 2022-04-19 PROCEDURE — 80048 BASIC METABOLIC PNL TOTAL CA: CPT

## 2022-04-19 PROCEDURE — 0QS604Z REPOSITION RIGHT UPPER FEMUR WITH INTERNAL FIXATION DEVICE, OPEN APPROACH: ICD-10-PCS | Performed by: GENERAL ACUTE CARE HOSPITAL

## 2022-04-19 PROCEDURE — 93010 ELECTROCARDIOGRAM REPORT: CPT | Performed by: INTERNAL MEDICINE

## 2022-04-19 PROCEDURE — 99233 SBSQ HOSP IP/OBS HIGH 50: CPT | Performed by: INTERNAL MEDICINE

## 2022-04-19 PROCEDURE — C1713 ANCHOR/SCREW BN/BN,TIS/BN: HCPCS | Performed by: GENERAL ACUTE CARE HOSPITAL

## 2022-04-19 PROCEDURE — 2709999900 HC NON-CHARGEABLE SUPPLY: Performed by: GENERAL ACUTE CARE HOSPITAL

## 2022-04-19 PROCEDURE — 71045 X-RAY EXAM CHEST 1 VIEW: CPT

## 2022-04-19 PROCEDURE — 93005 ELECTROCARDIOGRAM TRACING: CPT | Performed by: INTERNAL MEDICINE

## 2022-04-19 PROCEDURE — 2720000010 HC SURG SUPPLY STERILE: Performed by: GENERAL ACUTE CARE HOSPITAL

## 2022-04-19 PROCEDURE — 99292 CRITICAL CARE ADDL 30 MIN: CPT | Performed by: INTERNAL MEDICINE

## 2022-04-19 PROCEDURE — 2500000003 HC RX 250 WO HCPCS: Performed by: NURSE ANESTHETIST, CERTIFIED REGISTERED

## 2022-04-19 PROCEDURE — 80053 COMPREHEN METABOLIC PANEL: CPT

## 2022-04-19 PROCEDURE — 7100000000 HC PACU RECOVERY - FIRST 15 MIN: Performed by: GENERAL ACUTE CARE HOSPITAL

## 2022-04-19 DEVICE — BOLT BONE L85MM DIA10MM GLD TI ALLOY FOR FEM NK SYS: Type: IMPLANTABLE DEVICE | Site: HIP | Status: FUNCTIONAL

## 2022-04-19 DEVICE — SCREW BONE L90MM DIA6.4MM BLU TI ALLOY ANTIROTATION T25: Type: IMPLANTABLE DEVICE | Site: HIP | Status: FUNCTIONAL

## 2022-04-19 DEVICE — SCREW BONE L46MM DIA5MM TI ALLOY LCK ST W/ T25 STARDRV: Type: IMPLANTABLE DEVICE | Site: HIP | Status: FUNCTIONAL

## 2022-04-19 DEVICE — SCREW BNE LCK 5X48 MM T25 STAR DRV RECESS: Type: IMPLANTABLE DEVICE | Site: HIP | Status: FUNCTIONAL

## 2022-04-19 DEVICE — PLATE BONE 2 H 130DEG CCD ANG GLD TI ALLOY FOR FEM NK SYS: Type: IMPLANTABLE DEVICE | Site: HIP | Status: FUNCTIONAL

## 2022-04-19 RX ORDER — ONDANSETRON 4 MG/1
4 TABLET, ORALLY DISINTEGRATING ORAL EVERY 8 HOURS PRN
Status: DISCONTINUED | OUTPATIENT
Start: 2022-04-19 | End: 2022-04-21 | Stop reason: HOSPADM

## 2022-04-19 RX ORDER — SODIUM CHLORIDE 0.9 % (FLUSH) 0.9 %
5-40 SYRINGE (ML) INJECTION EVERY 12 HOURS SCHEDULED
Status: DISCONTINUED | OUTPATIENT
Start: 2022-04-19 | End: 2022-04-21 | Stop reason: HOSPADM

## 2022-04-19 RX ORDER — CEFAZOLIN SODIUM 1 G/3ML
INJECTION, POWDER, FOR SOLUTION INTRAMUSCULAR; INTRAVENOUS PRN
Status: DISCONTINUED | OUTPATIENT
Start: 2022-04-19 | End: 2022-04-19 | Stop reason: SDUPTHER

## 2022-04-19 RX ORDER — SODIUM CHLORIDE 0.9 % (FLUSH) 0.9 %
5-40 SYRINGE (ML) INJECTION PRN
Status: DISCONTINUED | OUTPATIENT
Start: 2022-04-19 | End: 2022-04-19 | Stop reason: HOSPADM

## 2022-04-19 RX ORDER — POLYETHYLENE GLYCOL 3350 17 G/17G
17 POWDER, FOR SOLUTION ORAL DAILY
Status: DISCONTINUED | OUTPATIENT
Start: 2022-04-19 | End: 2022-04-21 | Stop reason: HOSPADM

## 2022-04-19 RX ORDER — NALOXONE HYDROCHLORIDE 0.4 MG/ML
INJECTION, SOLUTION INTRAMUSCULAR; INTRAVENOUS; SUBCUTANEOUS
Status: COMPLETED
Start: 2022-04-19 | End: 2022-04-19

## 2022-04-19 RX ORDER — DEXAMETHASONE SODIUM PHOSPHATE 4 MG/ML
INJECTION, SOLUTION INTRA-ARTICULAR; INTRALESIONAL; INTRAMUSCULAR; INTRAVENOUS; SOFT TISSUE PRN
Status: DISCONTINUED | OUTPATIENT
Start: 2022-04-19 | End: 2022-04-19 | Stop reason: SDUPTHER

## 2022-04-19 RX ORDER — SODIUM CHLORIDE 0.9 % (FLUSH) 0.9 %
5-40 SYRINGE (ML) INJECTION PRN
Status: DISCONTINUED | OUTPATIENT
Start: 2022-04-19 | End: 2022-04-21 | Stop reason: HOSPADM

## 2022-04-19 RX ORDER — ONDANSETRON 2 MG/ML
INJECTION INTRAMUSCULAR; INTRAVENOUS PRN
Status: DISCONTINUED | OUTPATIENT
Start: 2022-04-19 | End: 2022-04-19 | Stop reason: SDUPTHER

## 2022-04-19 RX ORDER — FENTANYL CITRATE 50 UG/ML
INJECTION, SOLUTION INTRAMUSCULAR; INTRAVENOUS PRN
Status: DISCONTINUED | OUTPATIENT
Start: 2022-04-19 | End: 2022-04-19 | Stop reason: SDUPTHER

## 2022-04-19 RX ORDER — SODIUM CHLORIDE 0.9 % (FLUSH) 0.9 %
5-40 SYRINGE (ML) INJECTION EVERY 12 HOURS SCHEDULED
Status: DISCONTINUED | OUTPATIENT
Start: 2022-04-19 | End: 2022-04-19 | Stop reason: HOSPADM

## 2022-04-19 RX ORDER — ONDANSETRON 2 MG/ML
4 INJECTION INTRAMUSCULAR; INTRAVENOUS EVERY 6 HOURS PRN
Status: DISCONTINUED | OUTPATIENT
Start: 2022-04-19 | End: 2022-04-21 | Stop reason: HOSPADM

## 2022-04-19 RX ORDER — MIDAZOLAM HYDROCHLORIDE 1 MG/ML
INJECTION INTRAMUSCULAR; INTRAVENOUS PRN
Status: DISCONTINUED | OUTPATIENT
Start: 2022-04-19 | End: 2022-04-19 | Stop reason: SDUPTHER

## 2022-04-19 RX ORDER — MORPHINE SULFATE 2 MG/ML
2 INJECTION, SOLUTION INTRAMUSCULAR; INTRAVENOUS
Status: DISCONTINUED | OUTPATIENT
Start: 2022-04-19 | End: 2022-04-19

## 2022-04-19 RX ORDER — FENTANYL CITRATE 50 UG/ML
25 INJECTION, SOLUTION INTRAMUSCULAR; INTRAVENOUS EVERY 5 MIN PRN
Status: DISCONTINUED | OUTPATIENT
Start: 2022-04-19 | End: 2022-04-19 | Stop reason: HOSPADM

## 2022-04-19 RX ORDER — SODIUM CHLORIDE 9 MG/ML
INJECTION, SOLUTION INTRAVENOUS CONTINUOUS
Status: DISCONTINUED | OUTPATIENT
Start: 2022-04-19 | End: 2022-04-21 | Stop reason: HOSPADM

## 2022-04-19 RX ORDER — FENTANYL CITRATE 50 UG/ML
50 INJECTION, SOLUTION INTRAMUSCULAR; INTRAVENOUS EVERY 5 MIN PRN
Status: COMPLETED | OUTPATIENT
Start: 2022-04-19 | End: 2022-04-19

## 2022-04-19 RX ORDER — OXYCODONE HYDROCHLORIDE AND ACETAMINOPHEN 5; 325 MG/1; MG/1
1 TABLET ORAL EVERY 4 HOURS PRN
Status: DISCONTINUED | OUTPATIENT
Start: 2022-04-19 | End: 2022-04-21 | Stop reason: HOSPADM

## 2022-04-19 RX ORDER — ROCURONIUM BROMIDE 10 MG/ML
INJECTION, SOLUTION INTRAVENOUS PRN
Status: DISCONTINUED | OUTPATIENT
Start: 2022-04-19 | End: 2022-04-19 | Stop reason: SDUPTHER

## 2022-04-19 RX ORDER — BUPIVACAINE HYDROCHLORIDE 2.5 MG/ML
INJECTION, SOLUTION EPIDURAL; INFILTRATION; INTRACAUDAL PRN
Status: DISCONTINUED | OUTPATIENT
Start: 2022-04-19 | End: 2022-04-19 | Stop reason: HOSPADM

## 2022-04-19 RX ORDER — MORPHINE SULFATE 4 MG/ML
4 INJECTION, SOLUTION INTRAMUSCULAR; INTRAVENOUS
Status: DISCONTINUED | OUTPATIENT
Start: 2022-04-19 | End: 2022-04-19

## 2022-04-19 RX ORDER — KETOROLAC TROMETHAMINE 30 MG/ML
15 INJECTION, SOLUTION INTRAMUSCULAR; INTRAVENOUS ONCE
Status: COMPLETED | OUTPATIENT
Start: 2022-04-19 | End: 2022-04-19

## 2022-04-19 RX ORDER — NEOSTIGMINE METHYLSULFATE 1 MG/ML
INJECTION, SOLUTION INTRAVENOUS PRN
Status: DISCONTINUED | OUTPATIENT
Start: 2022-04-19 | End: 2022-04-19 | Stop reason: SDUPTHER

## 2022-04-19 RX ORDER — OXYCODONE HYDROCHLORIDE 5 MG/1
10 TABLET ORAL EVERY 4 HOURS PRN
Status: DISCONTINUED | OUTPATIENT
Start: 2022-04-19 | End: 2022-04-21 | Stop reason: HOSPADM

## 2022-04-19 RX ORDER — SODIUM CHLORIDE 9 MG/ML
INJECTION, SOLUTION INTRAVENOUS PRN
Status: DISCONTINUED | OUTPATIENT
Start: 2022-04-19 | End: 2022-04-19 | Stop reason: HOSPADM

## 2022-04-19 RX ORDER — PROPOFOL 10 MG/ML
INJECTION, EMULSION INTRAVENOUS PRN
Status: DISCONTINUED | OUTPATIENT
Start: 2022-04-19 | End: 2022-04-19 | Stop reason: SDUPTHER

## 2022-04-19 RX ORDER — ONDANSETRON 2 MG/ML
4 INJECTION INTRAMUSCULAR; INTRAVENOUS
Status: DISCONTINUED | OUTPATIENT
Start: 2022-04-19 | End: 2022-04-19 | Stop reason: HOSPADM

## 2022-04-19 RX ORDER — MEPERIDINE HYDROCHLORIDE 25 MG/ML
12.5 INJECTION INTRAMUSCULAR; INTRAVENOUS; SUBCUTANEOUS EVERY 5 MIN PRN
Status: DISCONTINUED | OUTPATIENT
Start: 2022-04-19 | End: 2022-04-19 | Stop reason: HOSPADM

## 2022-04-19 RX ORDER — OXYCODONE HYDROCHLORIDE 5 MG/1
5 TABLET ORAL EVERY 4 HOURS PRN
Status: DISCONTINUED | OUTPATIENT
Start: 2022-04-19 | End: 2022-04-21 | Stop reason: HOSPADM

## 2022-04-19 RX ORDER — LIDOCAINE HYDROCHLORIDE 10 MG/ML
INJECTION, SOLUTION EPIDURAL; INFILTRATION; INTRACAUDAL; PERINEURAL PRN
Status: DISCONTINUED | OUTPATIENT
Start: 2022-04-19 | End: 2022-04-19 | Stop reason: SDUPTHER

## 2022-04-19 RX ORDER — SODIUM CHLORIDE 9 MG/ML
25 INJECTION, SOLUTION INTRAVENOUS PRN
Status: DISCONTINUED | OUTPATIENT
Start: 2022-04-19 | End: 2022-04-21 | Stop reason: HOSPADM

## 2022-04-19 RX ORDER — NALOXONE HYDROCHLORIDE 0.4 MG/ML
0.4 INJECTION, SOLUTION INTRAMUSCULAR; INTRAVENOUS; SUBCUTANEOUS PRN
Status: DISCONTINUED | OUTPATIENT
Start: 2022-04-19 | End: 2022-04-21 | Stop reason: HOSPADM

## 2022-04-19 RX ORDER — GLYCOPYRROLATE 1 MG/5 ML
SYRINGE (ML) INTRAVENOUS PRN
Status: DISCONTINUED | OUTPATIENT
Start: 2022-04-19 | End: 2022-04-19 | Stop reason: SDUPTHER

## 2022-04-19 RX ADMIN — Medication 5 MG: at 18:30

## 2022-04-19 RX ADMIN — Medication 1 MG: at 18:30

## 2022-04-19 RX ADMIN — OXYCODONE 5 MG: 5 TABLET ORAL at 22:16

## 2022-04-19 RX ADMIN — NALOXONE HYDROCHLORIDE 0.4 MG: 0.4 INJECTION, SOLUTION INTRAMUSCULAR; INTRAVENOUS; SUBCUTANEOUS at 20:24

## 2022-04-19 RX ADMIN — FENTANYL CITRATE 25 MCG: 50 INJECTION, SOLUTION INTRAMUSCULAR; INTRAVENOUS at 18:32

## 2022-04-19 RX ADMIN — NALOXONE HYDROCHLORIDE 0.4 MG: 0.4 INJECTION, SOLUTION INTRAMUSCULAR; INTRAVENOUS; SUBCUTANEOUS at 20:27

## 2022-04-19 RX ADMIN — TRANEXAMIC ACID 1000 MG: 1 INJECTION, SOLUTION INTRAVENOUS at 19:33

## 2022-04-19 RX ADMIN — MORPHINE SULFATE 4 MG: 4 INJECTION, SOLUTION INTRAMUSCULAR; INTRAVENOUS at 00:13

## 2022-04-19 RX ADMIN — MORPHINE SULFATE 4 MG: 4 INJECTION, SOLUTION INTRAMUSCULAR; INTRAVENOUS at 13:50

## 2022-04-19 RX ADMIN — LIDOCAINE HYDROCHLORIDE 40 MG: 10 INJECTION, SOLUTION EPIDURAL; INFILTRATION; INTRACAUDAL; PERINEURAL at 16:51

## 2022-04-19 RX ADMIN — MIDAZOLAM 2 MG: 1 INJECTION INTRAMUSCULAR; INTRAVENOUS at 16:45

## 2022-04-19 RX ADMIN — FENTANYL CITRATE 50 MCG: 50 INJECTION, SOLUTION INTRAMUSCULAR; INTRAVENOUS at 19:21

## 2022-04-19 RX ADMIN — PROPOFOL 160 MG: 10 INJECTION, EMULSION INTRAVENOUS at 16:51

## 2022-04-19 RX ADMIN — FENTANYL CITRATE 25 MCG: 50 INJECTION, SOLUTION INTRAMUSCULAR; INTRAVENOUS at 18:38

## 2022-04-19 RX ADMIN — Medication 10 ML: at 09:32

## 2022-04-19 RX ADMIN — KETOROLAC TROMETHAMINE 15 MG: 30 INJECTION, SOLUTION INTRAMUSCULAR at 22:36

## 2022-04-19 RX ADMIN — FENTANYL CITRATE 50 MCG: 50 INJECTION, SOLUTION INTRAMUSCULAR; INTRAVENOUS at 16:45

## 2022-04-19 RX ADMIN — FENTANYL CITRATE 50 MCG: 50 INJECTION, SOLUTION INTRAMUSCULAR; INTRAVENOUS at 19:06

## 2022-04-19 RX ADMIN — ONDANSETRON 4 MG: 2 INJECTION INTRAMUSCULAR; INTRAVENOUS at 17:15

## 2022-04-19 RX ADMIN — SODIUM CHLORIDE: 9 INJECTION, SOLUTION INTRAVENOUS at 22:32

## 2022-04-19 RX ADMIN — OMEGA-3-ACID ETHYL ESTERS 1 G: 1 CAPSULE, LIQUID FILLED ORAL at 00:04

## 2022-04-19 RX ADMIN — INSULIN LISPRO 1 UNITS: 100 INJECTION, SOLUTION INTRAVENOUS; SUBCUTANEOUS at 22:24

## 2022-04-19 RX ADMIN — FENTANYL CITRATE 150 MCG: 50 INJECTION, SOLUTION INTRAMUSCULAR; INTRAVENOUS at 16:51

## 2022-04-19 RX ADMIN — DEXAMETHASONE SODIUM PHOSPHATE 10 MG: 4 INJECTION INTRA-ARTICULAR; INTRALESIONAL; INTRAMUSCULAR; INTRAVENOUS; SOFT TISSUE at 17:15

## 2022-04-19 RX ADMIN — MORPHINE SULFATE 4 MG: 4 INJECTION, SOLUTION INTRAMUSCULAR; INTRAVENOUS at 09:46

## 2022-04-19 RX ADMIN — CEFAZOLIN 2000 MG: 1 INJECTION, POWDER, FOR SOLUTION INTRAMUSCULAR; INTRAVENOUS at 17:17

## 2022-04-19 RX ADMIN — SODIUM CHLORIDE: 9 INJECTION, SOLUTION INTRAVENOUS at 15:42

## 2022-04-19 RX ADMIN — ROCURONIUM BROMIDE 50 MG: 50 INJECTION, SOLUTION INTRAVENOUS at 16:52

## 2022-04-19 RX ADMIN — MORPHINE SULFATE 4 MG: 4 INJECTION, SOLUTION INTRAMUSCULAR; INTRAVENOUS at 06:26

## 2022-04-19 ASSESSMENT — PULMONARY FUNCTION TESTS
PIF_VALUE: 2
PIF_VALUE: 14
PIF_VALUE: 12
PIF_VALUE: 13
PIF_VALUE: 1
PIF_VALUE: 2
PIF_VALUE: 14
PIF_VALUE: 21
PIF_VALUE: 14
PIF_VALUE: 15
PIF_VALUE: 1
PIF_VALUE: 2
PIF_VALUE: 13
PIF_VALUE: 0
PIF_VALUE: 2
PIF_VALUE: 18
PIF_VALUE: 12
PIF_VALUE: 14
PIF_VALUE: 12
PIF_VALUE: 14
PIF_VALUE: 18
PIF_VALUE: 14
PIF_VALUE: 2
PIF_VALUE: 18
PIF_VALUE: 13
PIF_VALUE: 15
PIF_VALUE: 13
PIF_VALUE: 12
PIF_VALUE: 19
PIF_VALUE: 15
PIF_VALUE: 13
PIF_VALUE: 14
PIF_VALUE: 2
PIF_VALUE: 14
PIF_VALUE: 14
PIF_VALUE: 2
PIF_VALUE: 14
PIF_VALUE: 2
PIF_VALUE: 3
PIF_VALUE: 3
PIF_VALUE: 15
PIF_VALUE: 1
PIF_VALUE: 2
PIF_VALUE: 21
PIF_VALUE: 14
PIF_VALUE: 0
PIF_VALUE: 12
PIF_VALUE: 14
PIF_VALUE: 19
PIF_VALUE: 13
PIF_VALUE: 14
PIF_VALUE: 0
PIF_VALUE: 2
PIF_VALUE: 12
PIF_VALUE: 14
PIF_VALUE: 14
PIF_VALUE: 2
PIF_VALUE: 14
PIF_VALUE: 14
PIF_VALUE: 15
PIF_VALUE: 14
PIF_VALUE: 13
PIF_VALUE: 14
PIF_VALUE: 14
PIF_VALUE: 3
PIF_VALUE: 16
PIF_VALUE: 14
PIF_VALUE: 24
PIF_VALUE: 14
PIF_VALUE: 1
PIF_VALUE: 14
PIF_VALUE: 2
PIF_VALUE: 10
PIF_VALUE: 15
PIF_VALUE: 14
PIF_VALUE: 2
PIF_VALUE: 14
PIF_VALUE: 14
PIF_VALUE: 2
PIF_VALUE: 17
PIF_VALUE: 14
PIF_VALUE: 18
PIF_VALUE: 14
PIF_VALUE: 13
PIF_VALUE: 2
PIF_VALUE: 2
PIF_VALUE: 13
PIF_VALUE: 19
PIF_VALUE: 14
PIF_VALUE: 16
PIF_VALUE: 18
PIF_VALUE: 16
PIF_VALUE: 22
PIF_VALUE: 14
PIF_VALUE: 18
PIF_VALUE: 14
PIF_VALUE: 19
PIF_VALUE: 13
PIF_VALUE: 18
PIF_VALUE: 18
PIF_VALUE: 14
PIF_VALUE: 3
PIF_VALUE: 15
PIF_VALUE: 13
PIF_VALUE: 18
PIF_VALUE: 14
PIF_VALUE: 14
PIF_VALUE: 15
PIF_VALUE: 19
PIF_VALUE: 1
PIF_VALUE: 18
PIF_VALUE: 15
PIF_VALUE: 15
PIF_VALUE: 14
PIF_VALUE: 19
PIF_VALUE: 13

## 2022-04-19 ASSESSMENT — PAIN DESCRIPTION - ONSET
ONSET: PROGRESSIVE
ONSET: ON-GOING

## 2022-04-19 ASSESSMENT — PAIN SCALES - GENERAL
PAINLEVEL_OUTOF10: 5
PAINLEVEL_OUTOF10: 4
PAINLEVEL_OUTOF10: 7
PAINLEVEL_OUTOF10: 6
PAINLEVEL_OUTOF10: 0
PAINLEVEL_OUTOF10: 9
PAINLEVEL_OUTOF10: 5
PAINLEVEL_OUTOF10: 7
PAINLEVEL_OUTOF10: 6
PAINLEVEL_OUTOF10: 7
PAINLEVEL_OUTOF10: 8
PAINLEVEL_OUTOF10: 8
PAINLEVEL_OUTOF10: 6
PAINLEVEL_OUTOF10: 6

## 2022-04-19 ASSESSMENT — PAIN DESCRIPTION - ORIENTATION
ORIENTATION: RIGHT

## 2022-04-19 ASSESSMENT — PAIN DESCRIPTION - DESCRIPTORS
DESCRIPTORS: ACHING;SHARP
DESCRIPTORS: ACHING;THROBBING
DESCRIPTORS: ACHING;SHARP
DESCRIPTORS: ACHING
DESCRIPTORS: THROBBING
DESCRIPTORS: ACHING
DESCRIPTORS: ACHING
DESCRIPTORS: ACHING;CONSTANT

## 2022-04-19 ASSESSMENT — PAIN - FUNCTIONAL ASSESSMENT
PAIN_FUNCTIONAL_ASSESSMENT: PREVENTS OR INTERFERES SOME ACTIVE ACTIVITIES AND ADLS
PAIN_FUNCTIONAL_ASSESSMENT: 0-10

## 2022-04-19 ASSESSMENT — PAIN DESCRIPTION - PAIN TYPE
TYPE: SURGICAL PAIN
TYPE: ACUTE PAIN
TYPE: ACUTE PAIN
TYPE: SURGICAL PAIN
TYPE: SURGICAL PAIN
TYPE: ACUTE PAIN
TYPE: SURGICAL PAIN
TYPE: ACUTE PAIN
TYPE: ACUTE PAIN

## 2022-04-19 ASSESSMENT — PAIN DESCRIPTION - FREQUENCY
FREQUENCY: CONTINUOUS

## 2022-04-19 ASSESSMENT — ENCOUNTER SYMPTOMS: DYSPNEA ACTIVITY LEVEL: AFTER AMBULATING 2 FLIGHTS OF STAIRS

## 2022-04-19 ASSESSMENT — PAIN DESCRIPTION - PROGRESSION
CLINICAL_PROGRESSION: GRADUALLY IMPROVING
CLINICAL_PROGRESSION: GRADUALLY IMPROVING
CLINICAL_PROGRESSION: RAPIDLY WORSENING
CLINICAL_PROGRESSION: NOT CHANGED
CLINICAL_PROGRESSION: NOT CHANGED
CLINICAL_PROGRESSION: GRADUALLY WORSENING

## 2022-04-19 ASSESSMENT — PAIN DESCRIPTION - LOCATION
LOCATION: HIP
LOCATION: BACK
LOCATION: HIP
LOCATION: HIP

## 2022-04-19 NOTE — PLAN OF CARE
Problem: Pain:  Goal: Pain level will decrease  Description: Pain level will decrease  Outcome: Met This Shift  Goal: Control of acute pain  Description: Control of acute pain  Outcome: Met This Shift     Problem: Falls - Risk of:  Goal: Will remain free from falls  Description: Will remain free from falls  Outcome: Met This Shift  Goal: Absence of physical injury  Description: Absence of physical injury  Outcome: Met This Shift     Problem: Pain:  Goal: Control of chronic pain  Description: Control of chronic pain  Outcome: Ongoing

## 2022-04-19 NOTE — OP NOTE
Operative Note      Patient: Chandler Massey YOB: 1955  MRN: [id number]    Date of Procedure: 4/19/2022    Pre-Op Diagnosis: Nondisplaced right femoral neck fracture    Post-Op Diagnosis: Same       Procedure(s):  RIGHT HIP OPEN REDUCTION INTERNAL FIXATION    Surgeon(s):  Johanna Phelan DO    Assistant:   * No surgical staff found *    Anesthesia: General    Estimated Blood Loss (mL): Approximately 542 cc    Complications: None    Specimens:   * No specimens in log *    Implants:  Implant Name Type Inv. Item Serial No.  Lot No. LRB No. Used Action   PLATE BONE 2 H 382PMQ CCD ANG GLD TI ALLOY FOR FEM NK SYS - [id number]  PLATE BONE 2 H [id number] CCD ANG GLD TI ALLOY FOR FEM NK SYS  [place] [id number] Right 1 Implanted   BOLT BONE L85MM [id number] GLD TI ALLOY FOR FEM NK SYS - [id number]  BOLT BONE L85MM [id number] GLD TI ALLOY FOR FEM NK SYS  [employer] [place]- [id number] Right 1 Implanted   SCREW BONE L90MM DIA6.4MM TONY TI ALLOY ANTIROTATION T25 - [id number]  SCREW BONE L90MM DIA6.4MM TONY TI ALLOY ANTIROTATION T25  [facility] [id number] Right 1 Implanted   SCREW BONE L46MM DIA5MM TI ALLOY LCK ST W/ T25 STARDRV - [id number]  SCREW BONE L46MM DIA5MM TI ALLOY LCK ST W/ T25 STARDRV  [employer] [facility] [id number] Right 1 Implanted   SCREW BNE LCK 5X48 MM T25 STAR DRV RECESS - [id number]  SCREW BNE LCK 5X48 MM T25 STAR DRV RECESS  [facility] [facility] [facility] [id number] Right 1 Implanted         Drains: * No LDAs found *    Findings: As expected see below    Detailed Description of Procedure: This is a 71-year-old male who was seen in consultation after sustaining a mechanical fall at his previous place of residence. X-rays as well as a CT scan were obtained in the ED which revealed evidence of a valgus impacted, minimally displaced femoral neck fracture. I discussed with him and his family both surgical and nonsurgical treatment. He elected to proceed forward with surgical intervention. The surgery was reviewed as were all pertinent risk, benefits alternatives treatment. Informed consent was obtained. Detailed Description of Procedure: The patient was greeted in preoperative holding area. The correct operative lower extremity was marked with an indelible skin marker. The patient was then taken to the operative suite and placed supine on the operating table. General anesthesia was then induced per the anesthesia team. A dose of preoperative antibiotics was then given. The patient was then transferred to the [facility] table. The bilateral heels were well-padded with web roll and secured into the boots. A timeout was then performed which patient, operative lower extremity as well as the procedure were confirmed by all parties present. Under live fluoroscopic guidance, I gently internally and externally rotated the operative hip. I noted that the femoral head/neck as well as the femoral shaft appeared to all move as 1 continuous unit. The fracture had maintained in appearance of valgus impaction with minimal displacement consistent with preoperative imaging. For this reason, I elected to continue forward with open reduction internal fixation. The operative lower extremity was then prepped and draped in a sterile orthopedic fashion. A #10 scalpel blade was then used to create a standard longitudinal incision over the lateral aspect of the hip. The incision was carried through subcutaneous tissues and bleeding vessels were coagulated using the Bovie. I then sharply incised to the iliotibial fascia with a fresh #10 scalpel blade. Self-retaining retractors were placed and I further used a Bovie to dissect down to the lateral cortex of the proximal femur. Blunt Hohmann retractors were then placed and I used a clean laparoscopic sponge in order to gently debride the lateral aspect of the femur from any underlying muscle tissue.  This allowed good exposure of the lateral femoral cortex for placement of the guidepin. The 135 degree angled guide was placed in approximation with the lateral aspect of the femur. Under fluoroscopic guidance, I then fired a K wire into the central aspect of the femoral neck on the AP x-ray, stopping just several millimeters short of subchondral bone. A lateral x-ray was taken which also confirmed good central positioning of the K wire within the bone and that the wire was proximal to the subchondral bone. A depth gauge was then utilized to determine the length of the central bolt. This was found to be 85 mm. On the back table, the drill guide was assembled and set to the appropriate depth. I then placed the drill over the guidepin and drilled until the positive stop was appreciated. The drill was removed and the guidepin positioning was confirmed on fluoroscopic guidance. Care was taken while drilling in order to ensure that there was no excessive perforation of the threaded guidepin into the hip joint. The Synthes femoral neck system was then assembled on the back table. Preoperatively, I had determined to use a 2 hole plate based on the patient's size. The bolt and plate was attached and then passed over the guidewire. Once this was secured into position, with fine-tuning made using gentle impaction, the guidewire was then removed. I then drilled within the appropriate guide hole of the aiming arm for the antirotational screw. Once the positive stop was met, the drill was removed and the antirotation screw was secured into place. This was done using a torque limiting screwdriver. A 90 mm antirotation screw was inserted. Finally, the aiming sleeve was inserted through proximal guide within the aiming arm. I drilled bicortically and used a depth gauge to determine screw length for the distal locking screw. This was found to be 46 mm in length. The screw was then inserted and secured into position.   This process was repeated for the distal locking screw, with a 48 mm screw being inserted and tightened into position. At this point, the aiming arm was then detached and final imaging was obtained. All wounds were copiously irrigated using normal saline. The defect and iliotibial fascia was closed using 0 Vicryl suture. A combination of 2-0 Vicryl as well as staples was used to close the skin incision. Approximately 20 cc of quarter percent Marcaine were injected in subcutaneous tissues. A sterile dressing was then placed over the incision. Anesthesia was then reversed and the patient was taken recovery in stable condition. There were no apparent complications.       Electronically signed by Roman Reyna DO on 4/19/2022 at 6:43 PM

## 2022-04-19 NOTE — CARE COORDINATION
Social work / Discharge Planning:         Social work met with patient and his wife for initial assessment. Patient uses a cane at home. He used home care in the past but cannot recall which one and has no history of USHA. Patient states that he has all of his diabetic supplies at home. Per IDR, plan is for ORIF today. Social work will follow to assist with discharge planning as needs are determined.    Electronically signed by TORRIE Townsend on 4/19/2022 at 10:41 AM

## 2022-04-19 NOTE — PROGRESS NOTES
[facility] Quality Flow/Interdisciplinary Rounds Progress Note        Quality Flow Rounds held on April 19, 2022    Disciplines Attending:  Bedside Nurse, ,  and Nursing Unit Leadership    Liliana Abraham was admitted on 4/18/2022  3:45 PM    Anticipated Discharge Date:  Expected Discharge Date: 05/21/22    Disposition:    Regulo Score:  Regulo Scale Score: 18    Readmission Risk              Risk of Unplanned Readmission:  9           Discussed patient goal for the day, patient clinical progression, and barriers to discharge.   The following Goal(s) of the Day/Commitment(s) have been identified:  Discharge 3712 Nikia De Paz RN  April 19, 2022

## 2022-04-19 NOTE — PROGRESS NOTES
Nursing Transfer Note    Data:  Summary of patients progress: right hip ORIF  Reason for transfer: continuation of care    Action:  Explained reason for transfer to patient. Family in waiting room notified to proceed to room 720  Report given to: RN using RN Handoff Navigator.   Mode of transportation: bed    Response:  RN Recommendations: follow post op orders

## 2022-04-19 NOTE — CONSULTS
Orthopaedic Consultation  Daina Wagner DO      CHIEF COMPLAINT: Right hip pain    Consulting provider: KAYLYN Santa    History of Present Illness: This gentleman is a 73-year-old male. He is a community ambulator. He states that roughly 4 days ago, he sustained a mechanical fall from standing height. He slipped in his kitchen when his right knee gave out on him. This caused him to fall directly onto his right hip. He was able to continue bearing weight but had increased pain over the next few days. He was brought to the ED by way of private vehicle yesterday. There, x-rays were obtained. These revealed evidence of a nondisplaced valgus impacted right femoral neck fracture. He was subsequently admitted for medical management. He also complains of knee and ankle pain since the fall. He does have a previous history of total knee arthroplasty with Dr. Shaq Thakkar. He believes this occurred about 2 years ago. Past Medical History:   Diagnosis Date    Diabetes mellitus ([facility] [address]     Hyperlipidemia     Hypertension     Osteoarthritis     TIA (transient ischemic attack)          Past Surgical History:   Procedure Laterality Date    KNEE SURGERY Left     fracture repair    ROTATOR CUFF REPAIR Bilateral     TOTAL KNEE ARTHROPLASTY Left 02/16/2017    x3        Medications Prior to Admission:    Medications Prior to Admission: empagliflozin (JARDIANCE) 25 MG tablet, Take 25 mg by mouth daily  OZEMPIC, 1 MG/DOSE, 4 MG/3ML SOPN, Inject 0.5 mg into the skin once a week Patient takes on mondays; did not take today 4/18  canagliflozin-metformin HCl (INVOKAMET)  MG, Take 50-1,000 mg by mouth  fenofibrate micronized (LOFIBRA) 200 MG capsule, TAKE ONE CAPSULE BY MOUTH ONCE EVERY DAY WITH A MEAL.  (Patient not taking: Reported on 4/18/2022)  VASCEPA 1 g CAPS capsule, TAKE TWO CAPSULES BY MOUTH TWO TIMES A DAY WITH MEALS (Patient not taking: Reported on 4/18/2022)  glimepiride (AMARYL) 2 MG tablet, Take 2 mg by mouth  Semaglutide,0.25 or 0.5MG/DOS, 2 MG/1.5ML SOPN, Inject 0.5 mg into the skin (Patient not taking: Reported on 4/18/2022)  aspirin 81 MG tablet, Take 162 mg by mouth daily  clopidogrel (PLAVIX) 75 MG tablet, Take 1 tablet by mouth daily (Patient not taking: Reported on 4/18/2022)  atorvastatin (LIPITOR) 40 MG tablet, Take 1 tablet by mouth nightly (Patient not taking: Reported on 4/18/2022)  lisinopril (PRINIVIL;ZESTRIL) 10 MG tablet, Take 10 mg by mouth daily  Multiple Vitamins-Minerals (MENS ONE DAILY PO), Take by mouth daily    Allergies:    Acetaminophen-codeine    Social History:    reports that he has quit smoking. He has never used smokeless tobacco. He reports current alcohol use. He reports that he does not use drugs. Family History:   family history is not on file. REVIEW OF SYSTEMS:  As above in the HPI, otherwise negative    PHYSICAL EXAM:    Vitals:  /84   Pulse 90   Temp 97.1 °F (36.2 °C)   Resp 18   Ht 6' 4\" (1.93 m)   Wt 233 lb (105.7 kg)   SpO2 95%   BMI 28.36 kg/m²     General:  Awake, alert, oriented X 3. Well developed, well nourished, well groomed. No apparent distress. HEENT:  Normocephalic, atraumatic. Pupils equal, round, reactive to light. No scleral icterus. No conjunctival injection. Normal lips, teeth, and gums. No nasal discharge. Neck:  Supple  Heart:  RRR, no murmurs, gallops, or rubs  Lungs:  CTA bilaterally, bilat symmetrical expansion, no wheeze, rales, or rhonchi  Abdomen: Bowel sounds present, soft, nontender, no masses, no organomegaly, no peritoneal signs  Extremities: No gross deformity about the right lower extremity. Skin circumferentially intact. Sensation is normal, L3-S1. All compartments are soft compressible. Dorsiflexion/plantarflexion, EHL strength 5 out of 5.   Range of motion of the right hip not assessed due to known underlying fracture  Skin:  Warm and dry, no open lesions or rash  Neuro:  Cranial nerves 2-12

## 2022-04-19 NOTE — ANESTHESIA PRE PROCEDURE
Department of Anesthesiology  Preprocedure Note       Name:  Gianna Parker   Age:  73 y.o.  :  1955                                          MRN:  [id number]         Date:  2022      Surgeon: Kate Rogers):  Annie Grace DO    Procedure: Procedure(s):  RIGHT HIP OPEN REDUCTION INTERNAL FIXATION  ++SYNTHES++    Medications prior to admission:   Prior to Admission medications    Medication Sig Start Date End Date Taking? Authorizing Provider   empagliflozin (JARDIANCE) 25 MG tablet Take 25 mg by mouth daily   Yes Historical Provider, MD   OZEMPIC, 1 MG/DOSE, 4 MG/3ML SOPN Inject 0.5 mg into the skin once a week Patient takes on ; did not take today 22   Historical Provider, MD   canagliflozin-metformin HCl (INVOKAMET)  MG Take 50-1,000 mg by mouth    Historical Provider, MD   fenofibrate micronized (LOFIBRA) 200 MG capsule TAKE ONE CAPSULE BY MOUTH ONCE EVERY DAY WITH A MEAL.   Patient not taking: Reported on 2022   Historical Provider, MD   VASCEPA 1 g CAPS capsule TAKE TWO CAPSULES BY MOUTH TWO TIMES A DAY WITH MEALS  Patient not taking: Reported on 2022   Historical Provider, MD   glimepiride (AMARYL) 2 MG tablet Take 2 mg by mouth    Historical Provider, MD   Semaglutide,0.25 or 0.5MG/DOS, 2 MG/1.5ML SOPN Inject 0.5 mg into the skin  Patient not taking: Reported on 2022    Historical Provider, MD   aspirin 81 MG tablet Take 162 mg by mouth daily    Historical Provider, MD   clopidogrel (PLAVIX) 75 MG tablet Take 1 tablet by mouth daily  Patient not taking: Reported on 2022   JAMAL Eddy - NP   atorvastatin (LIPITOR) 40 MG tablet Take 1 tablet by mouth nightly  Patient not taking: Reported on 2022 7/10/19   JAMAL Adamson - CNP   lisinopril (PRINIVIL;ZESTRIL) 10 MG tablet Take 10 mg by mouth daily    Historical Provider, MD   Multiple Vitamins-Minerals (MENS ONE DAILY PO) Take by mouth daily    Historical Provider, MD       Current medications:    Current Facility-Administered Medications   Medication Dose Route Frequency Provider Last Rate Last Admin    sodium chloride flush 0.9 % injection 5-40 mL  5-40 mL IntraVENous 2 times per day Tommy Bloch Berdis, DO   10 mL at 04/19/22 0932    sodium chloride flush 0.9 % injection 5-40 mL  5-40 mL IntraVENous PRN Tommy Bloch Berdis, DO        0.9 % sodium chloride infusion  25 mL IntraVENous PRN Tommy Bloch Berdis, DO        ceFAZolin (ANCEF) 2000 mg in sterile water 20 mL IV syringe  2,000 mg IntraVENous See Admin Instructions Tommy Bloch Berdis, DO        atorvastatin (LIPITOR) tablet 40 mg  40 mg Oral Nightly MD Afua Panda ON 4/20/2022] aspirin tablet 162 mg  162 mg Oral Daily Kimo Dodson MD        clopidogrel (PLAVIX) tablet 75 mg  75 mg Oral Daily Kimo Dodson MD        glipiZIDE (GLUCOTROL) tablet 5 mg  5 mg Oral BID AC Kimo Dodson MD        omega-3 acid ethyl esters (LOVAZA) capsule 1 g  1 g Oral BID Kimo Dodson MD   1 g at 04/19/22 0004    insulin lispro (HUMALOG) injection vial 0-6 Units  0-6 Units SubCUTAneous Nightly Kimo Dodson MD   2 Units at 04/18/22 2242    morphine sulfate (PF) injection 4 mg  4 mg IntraVENous Q3H PRN Kimo Dodson MD   4 mg at 04/19/22 0946    diphenhydrAMINE (BENADRYL) injection 25 mg  25 mg IntraVENous Q6H PRN Kimo Dodson MD        ondansetron [facility]) injection 4 mg  4 mg IntraVENous Q6H PRN Kimo Dodson MD        insulin lispro (HUMALOG) injection vial 0-12 Units  0-12 Units SubCUTAneous TID  Kiera Church, APRN - CNP           Allergies:     Allergies   Allergen Reactions    Acetaminophen-Codeine Hives       Problem List:    Patient Active Problem List   Diagnosis Code    Primary osteoarthritis of left knee M17.12    [facility] E11.9    TIA (transient ischemic attack) G45.9    Hyperlipidemia E78.5    Ex-smoker Z87.891    Cl[facility] right [facility] S71.0A       Past Medical History:        Diagnosis Date    Diabetes mellitus ([address]     Hyperlipidemia     Hypertension     Osteoarthritis     TIA (transient ischemic attack)        Past Surgical History:        Procedure Laterality Date    KNEE SURGERY Left     fracture repair    ROTATOR CUFF REPAIR Bilateral     TOTAL KNEE ARTHROPLASTY Left 02/16/2017    x3        Social History:    Social History     Tobacco Use    Smoking status: Former Smoker    Smokeless tobacco: Never Used   Substance Use Topics    Alcohol use: Yes     Comment: occassional                                Counseling given: Not Answered      Vital Signs (Current):   Vitals:    04/19/22 0043 04/19/22 0626 04/19/22 0656 04/19/22 0745   BP:    110/75   Pulse:    90   Resp: 16 18 17 16   Temp:    36.9 °C (98.4 °F)   TempSrc:    Oral   SpO2:    100%   Weight:       Height:                                                  BP Readings from Last 3 Encounters:   04/19/22 110/75   02/17/20 111/70   07/10/19 113/71       NPO Status: Time of last liquid consumption: 2100                        Time of last solid consumption: 1900                        Date of last liquid consumption: 04/18/22                        Date of last solid food consumption: 04/18/22    BMI:   Wt Readings from Last 3 Encounters:   04/18/22 233 lb (105.7 kg)   02/17/20 241 lb (109.3 kg)   07/10/19 244 lb 2 oz (110.7 kg)     Body mass index is 28.36 kg/m².     CBC:   Lab Results   Component Value Date    WBC 7.0 04/19/2022    RBC 4.73 04/19/2022    HGB 14.5 04/19/2022    HCT 43.4 04/19/2022    MCV 91.8 04/19/2022    RDW 13.2 04/19/2022     04/19/2022       CMP:   Lab Results   Component Value Date     04/19/2022    K 4.2 04/19/2022     04/19/2022    CO2 23 04/19/2022    BUN 16 04/19/2022    CREATININE 0.9 04/19/2022    GFRAA >60 04/19/2022    LABGLOM >60 04/19/2022    GLUCOSE 138 04/19/2022    PROT 6.5 07/09/2019    CALCIUM 10.1 04/19/2022    BILITOT 0.3 07/09/2019    ALKPHOS 47 07/09/2019    AST 33 07/09/2019    ALT 25 07/09/2019       POC Tests: No results for input(s): POCGLU, POCNA, POCK, POCCL, POCBUN, POCHEMO, POCHCT in the last 72 hours. Coags:   Lab Results   Component Value Date    PROTIME 11.3 04/18/2022    INR 1.0 04/18/2022    APTT 28.5 04/18/2022       HCG (If Applicable): No results found for: PREGTESTUR, PREGSERUM, HCG, HCGQUANT     ABGs: No results found for: PHART, PO2ART, PMS6LJY, NFJ9RCW, BEART, M6JDPVPB     Type & Screen (If Applicable):  No results found for: LABABO, LABRH    Drug/Infectious Status (If Applicable):  No results found for: HIV, HEPCAB    COVID-19 Screening (If Applicable): No results found for: COVID19    Chest X ray 4-    FINDINGS:   The lungs are without acute focal process.  There is no effusion or   pneumothorax. The cardiomediastinal silhouette is without acute process. The   osseous structures are without acute process.             EKG 4-     Narrative & Impression    Normal sinus rhythm  Low voltage QRS  Delayed precordial transition  Abnormal ECG  When compared with ECG of 18-FEB-2017 12:02,  Sinus tachycardia resolved  Confirmed by Cole Parrish ([id number]) on 4/19/2022 5:46:13 AM      Specimen Collected: 04/18/22 19:56 Last Resulted: 04/19/22 05:46        ECHO 7-     Conclusions      Summary   Ejection fraction is visually estimated at 65%. No regional wall motion abnormalities seen. Normal right ventricle structure and function. Physiologic and/or trace mitral regurgitation is present. Agitated saline injected for shunt evaluation. No evidence of patent foramen ovale. No evidence of atrial septal defect.       Signature      ----------------------------------------------------------------   Electronically signed by Ameya Hays DO(Interpreting   physician) on 07/10/2019 11:13 AM   ----------------------------------------------------------------          Anesthesia Evaluation  Patient summary reviewed and Nursing notes reviewed no history of anesthetic complications:   Airway: Mallampati: II  TM distance: >3 FB   Neck ROM: full  Mouth opening: > = 3 FB Dental:    (+) edentulous      Pulmonary: breath sounds clear to auscultation  (+) asthma:                           ROS comment: Former Smoker   Cardiovascular:  Exercise tolerance: good (>4 METS),   (+) hypertension: moderate, HOLLOWAY: after ambulating 2 flights of stairs, hyperlipidemia      ECG reviewed  Rhythm: regular  Rate: normal  Echocardiogram reviewed         Beta Blocker:  Not on Beta Blocker         Neuro/Psych:   (+) TIA,              ROS comment: Neuropathy in hands and feet numbness and tingling. GI/Hepatic/Renal:   (+) GERD: well controlled,           Endo/Other:    (+) DiabetesType II DM, , : arthritis: OA., .                 Abdominal:             Vascular: negative vascular ROS. ROS comment: 20g iv in right forearm. Other Findings:           Anesthesia Plan      general     ASA 3       Induction: intravenous. MIPS: Postoperative opioids intended and Prophylactic antiemetics administered. Anesthetic plan and risks discussed with patient, spouse and child/children. Plan discussed with CRNA and attending. Maricruz Sr RN   4/19/2022    DOS STAFF ADDENDUM:    Pt seen and examined, chart reviewed (including anesthesia, drug and allergy history). Anesthetic plan, risks, benefits, alternatives, and personnel involved discussed with patient. Patient verbalized an understanding and agrees to proceed. Plan discussed with care team members and agreed upon. Dayami Rao MD  Staff Anesthesiologist  4:20 PM    Chart reviewed, patient seen and interviewed. Agree with note above.   JAMAL Walsh - CRNA

## 2022-04-19 NOTE — PROGRESS NOTES
[facility] Progress Note    --------------------------------------------------------------------------------------  Assessment / Plan  · Right femoral neck fracture  · Diabetes  · History hyperlipidemia, hypertension, arthritis, past TIA    Patient is for ORIF today with orthopedic surgery. Otherwise, vitals and labs are stable, home medications been continued, blood sugars are relatively well controlled in the 140s. Okay for surgery from my standpoint. Please see orders for further plan of care.  Code status  full code   DVT prophylaxis held for surgery   Disposition  to be determined  --------------------------------------------------------------------------------------    Admission Date  4/18/2022  3:45 PM  Chief Complaint Fall    Subjective  History of Present Illness  Pain is moderate, no distress, plan is for ORIF today. I see nothing that would prevent this from happening. Case discussed with charge nurse.     Review of Systems - 12-point review of systems has been reviewed and is otherwise negative except as listed in the HPI    Objective  Physical Exam  Vitals: /75   Pulse 90   Temp 98.4 °F (36.9 °C) (Oral)   Resp 16   Ht 6' 4\" (1.93 m)   Wt 233 lb (105.7 kg)   SpO2 100%   BMI 28.36 kg/m²   General: well-developed, well-nourished, no acute distress, cooperative  Skin: warm, dry, intact, normal color without cyanosis  HEENT: normocephalic, atraumatic, mucous membranes normal  Respiratory: clear to auscultation bilaterally without respiratory distress  Cardiovascular: regular rate and rhythm without murmur / rub / gallop  Abdominal: soft, nontender, nondistended, normoactive bowel sounds  Extremities: no mottling, pulses intact, no edema  Neurologic: awake, alert, no focal deficits  Psychiatric: normal affect, cooperative    Electronically signed by Polly Romero DO on 4/19/2022 at 12:36 PM

## 2022-04-20 ENCOUNTER — APPOINTMENT (OUTPATIENT)
Dept: CT IMAGING | Age: 67
DRG: 480 | End: 2022-04-20
Payer: MEDICARE

## 2022-04-20 LAB
ALBUMIN SERPL-MCNC: 3.5 G/DL (ref 3.5–5.2)
ALP BLD-CCNC: 56 U/L (ref 40–129)
ALT SERPL-CCNC: 56 U/L (ref 0–40)
ANION GAP SERPL CALCULATED.3IONS-SCNC: 13 MMOL/L (ref 7–16)
AST SERPL-CCNC: 59 U/L (ref 0–39)
BASOPHILS ABSOLUTE: 0.02 E9/L (ref 0–0.2)
BASOPHILS RELATIVE PERCENT: 0.3 % (ref 0–2)
BILIRUB SERPL-MCNC: 0.3 MG/DL (ref 0–1.2)
BUN BLDV-MCNC: 18 MG/DL (ref 6–23)
CALCIUM SERPL-MCNC: 7.9 MG/DL (ref 8.6–10.2)
CHLORIDE BLD-SCNC: 106 MMOL/L (ref 98–107)
CO2: 18 MMOL/L (ref 22–29)
CREAT SERPL-MCNC: 0.8 MG/DL (ref 0.7–1.2)
EKG ATRIAL RATE: 106 BPM
EKG P AXIS: 64 DEGREES
EKG P-R INTERVAL: 146 MS
EKG Q-T INTERVAL: 340 MS
EKG QRS DURATION: 84 MS
EKG QTC CALCULATION (BAZETT): 451 MS
EKG R AXIS: 26 DEGREES
EKG T AXIS: 57 DEGREES
EKG VENTRICULAR RATE: 106 BPM
EOSINOPHILS ABSOLUTE: 0.17 E9/L (ref 0.05–0.5)
EOSINOPHILS RELATIVE PERCENT: 2.6 % (ref 0–6)
GFR AFRICAN AMERICAN: >60
GFR NON-AFRICAN AMERICAN: >60 ML/MIN/1.73
GLUCOSE BLD-MCNC: 278 MG/DL (ref 74–99)
HCT VFR BLD CALC: 35.8 % (ref 37–54)
HEMOGLOBIN: 12.1 G/DL (ref 12.5–16.5)
IMMATURE GRANULOCYTES #: 0.05 E9/L
IMMATURE GRANULOCYTES %: 0.8 % (ref 0–5)
LYMPHOCYTES ABSOLUTE: 0.53 E9/L (ref 1.5–4)
LYMPHOCYTES RELATIVE PERCENT: 8.1 % (ref 20–42)
MAGNESIUM: 1.5 MG/DL (ref 1.6–2.6)
MCH RBC QN AUTO: 30.7 PG (ref 26–35)
MCHC RBC AUTO-ENTMCNC: 33.8 % (ref 32–34.5)
MCV RBC AUTO: 90.9 FL (ref 80–99.9)
METER GLUCOSE: 204 MG/DL (ref 74–99)
METER GLUCOSE: 263 MG/DL (ref 74–99)
METER GLUCOSE: 267 MG/DL (ref 74–99)
METER GLUCOSE: 268 MG/DL (ref 74–99)
MONOCYTES ABSOLUTE: 0.21 E9/L (ref 0.1–0.95)
MONOCYTES RELATIVE PERCENT: 3.2 % (ref 2–12)
NEUTROPHILS ABSOLUTE: 5.56 E9/L (ref 1.8–7.3)
NEUTROPHILS RELATIVE PERCENT: 85 % (ref 43–80)
PDW BLD-RTO: 13.1 FL (ref 11.5–15)
PHOSPHORUS: 3.2 MG/DL (ref 2.5–4.5)
PLATELET # BLD: 210 E9/L (ref 130–450)
PMV BLD AUTO: 8.8 FL (ref 7–12)
POTASSIUM SERPL-SCNC: 4 MMOL/L (ref 3.5–5)
PROCALCITONIN: 0.09 NG/ML (ref 0–0.08)
RBC # BLD: 3.94 E12/L (ref 3.8–5.8)
RBC # BLD: NORMAL 10*6/UL
SODIUM BLD-SCNC: 137 MMOL/L (ref 132–146)
TOTAL PROTEIN: 5.8 G/DL (ref 6.4–8.3)
WBC # BLD: 6.5 E9/L (ref 4.5–11.5)

## 2022-04-20 PROCEDURE — 93010 ELECTROCARDIOGRAM REPORT: CPT | Performed by: INTERNAL MEDICINE

## 2022-04-20 PROCEDURE — 84100 ASSAY OF PHOSPHORUS: CPT

## 2022-04-20 PROCEDURE — 6370000000 HC RX 637 (ALT 250 FOR IP): Performed by: INTERNAL MEDICINE

## 2022-04-20 PROCEDURE — 99233 SBSQ HOSP IP/OBS HIGH 50: CPT | Performed by: INTERNAL MEDICINE

## 2022-04-20 PROCEDURE — 36415 COLL VENOUS BLD VENIPUNCTURE: CPT

## 2022-04-20 PROCEDURE — 1200000000 HC SEMI PRIVATE

## 2022-04-20 PROCEDURE — 6360000002 HC RX W HCPCS: Performed by: INTERNAL MEDICINE

## 2022-04-20 PROCEDURE — 6360000004 HC RX CONTRAST MEDICATION: Performed by: RADIOLOGY

## 2022-04-20 PROCEDURE — 2700000000 HC OXYGEN THERAPY PER DAY

## 2022-04-20 PROCEDURE — 2580000003 HC RX 258: Performed by: GENERAL ACUTE CARE HOSPITAL

## 2022-04-20 PROCEDURE — 82962 GLUCOSE BLOOD TEST: CPT

## 2022-04-20 PROCEDURE — 6370000000 HC RX 637 (ALT 250 FOR IP): Performed by: GENERAL ACUTE CARE HOSPITAL

## 2022-04-20 PROCEDURE — 80053 COMPREHEN METABOLIC PANEL: CPT

## 2022-04-20 PROCEDURE — 83735 ASSAY OF MAGNESIUM: CPT

## 2022-04-20 PROCEDURE — 85025 COMPLETE CBC W/AUTO DIFF WBC: CPT

## 2022-04-20 PROCEDURE — 6370000000 HC RX 637 (ALT 250 FOR IP): Performed by: NURSE PRACTITIONER

## 2022-04-20 PROCEDURE — 97165 OT EVAL LOW COMPLEX 30 MIN: CPT

## 2022-04-20 PROCEDURE — 71275 CT ANGIOGRAPHY CHEST: CPT

## 2022-04-20 PROCEDURE — 97530 THERAPEUTIC ACTIVITIES: CPT

## 2022-04-20 PROCEDURE — 97161 PT EVAL LOW COMPLEX 20 MIN: CPT

## 2022-04-20 PROCEDURE — 6360000002 HC RX W HCPCS: Performed by: GENERAL ACUTE CARE HOSPITAL

## 2022-04-20 RX ORDER — MAGNESIUM SULFATE IN WATER 40 MG/ML
2000 INJECTION, SOLUTION INTRAVENOUS ONCE
Status: DISCONTINUED | OUTPATIENT
Start: 2022-04-20 | End: 2022-04-21 | Stop reason: HOSPADM

## 2022-04-20 RX ORDER — ENOXAPARIN SODIUM 100 MG/ML
30 INJECTION SUBCUTANEOUS 2 TIMES DAILY
Status: DISCONTINUED | OUTPATIENT
Start: 2022-04-20 | End: 2022-04-21 | Stop reason: HOSPADM

## 2022-04-20 RX ORDER — OXYCODONE HYDROCHLORIDE AND ACETAMINOPHEN 5; 325 MG/1; MG/1
1 TABLET ORAL EVERY 6 HOURS PRN
Qty: 30 TABLET | Refills: 0 | Status: SHIPPED | OUTPATIENT
Start: 2022-04-20 | End: 2022-04-23

## 2022-04-20 RX ORDER — MAGNESIUM SULFATE IN WATER 40 MG/ML
2000 INJECTION, SOLUTION INTRAVENOUS ONCE
Status: COMPLETED | OUTPATIENT
Start: 2022-04-20 | End: 2022-04-20

## 2022-04-20 RX ORDER — NICOTINE POLACRILEX 4 MG
15 LOZENGE BUCCAL PRN
Status: DISCONTINUED | OUTPATIENT
Start: 2022-04-20 | End: 2022-04-21 | Stop reason: HOSPADM

## 2022-04-20 RX ORDER — ENOXAPARIN SODIUM 100 MG/ML
30 INJECTION SUBCUTANEOUS 2 TIMES DAILY
Qty: 16.8 ML | Refills: 0 | Status: SHIPPED | OUTPATIENT
Start: 2022-04-20 | End: 2022-05-18

## 2022-04-20 RX ORDER — DEXTROSE MONOHYDRATE 50 MG/ML
100 INJECTION, SOLUTION INTRAVENOUS PRN
Status: DISCONTINUED | OUTPATIENT
Start: 2022-04-20 | End: 2022-04-21 | Stop reason: HOSPADM

## 2022-04-20 RX ORDER — DEXTROSE MONOHYDRATE 25 G/50ML
12.5 INJECTION, SOLUTION INTRAVENOUS PRN
Status: DISCONTINUED | OUTPATIENT
Start: 2022-04-20 | End: 2022-04-21 | Stop reason: HOSPADM

## 2022-04-20 RX ADMIN — SODIUM CHLORIDE, PRESERVATIVE FREE 10 ML: 5 INJECTION INTRAVENOUS at 22:13

## 2022-04-20 RX ADMIN — INSULIN LISPRO 3 UNITS: 100 INJECTION, SOLUTION INTRAVENOUS; SUBCUTANEOUS at 22:19

## 2022-04-20 RX ADMIN — Medication 2000 MG: at 09:09

## 2022-04-20 RX ADMIN — ENOXAPARIN SODIUM 30 MG: 100 INJECTION SUBCUTANEOUS at 22:12

## 2022-04-20 RX ADMIN — INSULIN LISPRO 6 UNITS: 100 INJECTION, SOLUTION INTRAVENOUS; SUBCUTANEOUS at 16:21

## 2022-04-20 RX ADMIN — IOPAMIDOL 75 ML: 755 INJECTION, SOLUTION INTRAVENOUS at 05:21

## 2022-04-20 RX ADMIN — OMEGA-3-ACID ETHYL ESTERS 1 G: 1 CAPSULE, LIQUID FILLED ORAL at 22:13

## 2022-04-20 RX ADMIN — ONDANSETRON 4 MG: 2 INJECTION INTRAMUSCULAR; INTRAVENOUS at 00:03

## 2022-04-20 RX ADMIN — INSULIN LISPRO 6 UNITS: 100 INJECTION, SOLUTION INTRAVENOUS; SUBCUTANEOUS at 11:06

## 2022-04-20 RX ADMIN — MAGNESIUM SULFATE HEPTAHYDRATE 2000 MG: 40 INJECTION, SOLUTION INTRAVENOUS at 03:46

## 2022-04-20 RX ADMIN — OXYCODONE AND ACETAMINOPHEN 1 TABLET: 5; 325 TABLET ORAL at 02:13

## 2022-04-20 RX ADMIN — Medication 2000 MG: at 01:12

## 2022-04-20 RX ADMIN — ASPIRIN 162 MG: 325 TABLET ORAL at 08:50

## 2022-04-20 RX ADMIN — ATORVASTATIN CALCIUM 40 MG: 40 TABLET, FILM COATED ORAL at 22:12

## 2022-04-20 RX ADMIN — POLYETHYLENE GLYCOL 3350 17 G: 17 POWDER, FOR SOLUTION ORAL at 08:50

## 2022-04-20 RX ADMIN — ENOXAPARIN SODIUM 40 MG: 40 INJECTION SUBCUTANEOUS at 08:50

## 2022-04-20 RX ADMIN — OXYCODONE 5 MG: 5 TABLET ORAL at 10:47

## 2022-04-20 RX ADMIN — OXYCODONE 5 MG: 5 TABLET ORAL at 14:46

## 2022-04-20 RX ADMIN — OMEGA-3-ACID ETHYL ESTERS 1 G: 1 CAPSULE, LIQUID FILLED ORAL at 08:50

## 2022-04-20 ASSESSMENT — PAIN SCALES - GENERAL
PAINLEVEL_OUTOF10: 3
PAINLEVEL_OUTOF10: 3
PAINLEVEL_OUTOF10: 0
PAINLEVEL_OUTOF10: 5
PAINLEVEL_OUTOF10: 3
PAINLEVEL_OUTOF10: 3

## 2022-04-20 ASSESSMENT — PAIN DESCRIPTION - FREQUENCY
FREQUENCY: CONTINUOUS
FREQUENCY: CONTINUOUS

## 2022-04-20 ASSESSMENT — PAIN DESCRIPTION - ONSET
ONSET: ON-GOING
ONSET: ON-GOING

## 2022-04-20 ASSESSMENT — PAIN DESCRIPTION - LOCATION
LOCATION: HIP
LOCATION: HIP

## 2022-04-20 ASSESSMENT — PAIN DESCRIPTION - DESCRIPTORS
DESCRIPTORS: ACHING;CONSTANT
DESCRIPTORS: ACHING;CONSTANT

## 2022-04-20 ASSESSMENT — PAIN DESCRIPTION - ORIENTATION
ORIENTATION: RIGHT
ORIENTATION: RIGHT

## 2022-04-20 ASSESSMENT — PAIN DESCRIPTION - PROGRESSION
CLINICAL_PROGRESSION: GRADUALLY IMPROVING
CLINICAL_PROGRESSION: GRADUALLY IMPROVING

## 2022-04-20 ASSESSMENT — PAIN - FUNCTIONAL ASSESSMENT
PAIN_FUNCTIONAL_ASSESSMENT: PREVENTS OR INTERFERES SOME ACTIVE ACTIVITIES AND ADLS
PAIN_FUNCTIONAL_ASSESSMENT: PREVENTS OR INTERFERES SOME ACTIVE ACTIVITIES AND ADLS

## 2022-04-20 ASSESSMENT — PAIN DESCRIPTION - PAIN TYPE
TYPE: SURGICAL PAIN
TYPE: SURGICAL PAIN

## 2022-04-20 NOTE — PROGRESS NOTES
Physical Therapy  Facility/Department: [facility] SURGERY  Physical Therapy Initial Assessment    Name: Dannielle Paredes. : 1955  MRN: [id number]  Date of Service: 2022      Attending Provider:  Junior Feldman DO    Evaluating PT:  Nely Miramontes P.TAngus Room #:  [facility]  Diagnosis:  Closed fracture of neck of right femur, initial encounter ([facility] Elijah Welsh  Fall, initial encounter [W19. XXXA]  Closed right hip fracture, initial encounter ([facility] [S72.001A]  Acute pain of left knee [M25.562]  Procedure/Surgery:  R hip ORIF 22  Precautions:  TTWB RLE, Falls  Equipment Needs:  Wheeled walker    SUBJECTIVE:    Pt lives with wife in a 1 story home with a ramp to enter through the backdoor. Pt ambulated with a cane PTA. OBJECTIVE:   Initial Evaluation  Date: 22 Treatment Short Term/ Long Term   Goals   Was pt agreeable to Eval/treatment? yes     Does pt have pain? 3/10 R hip pain     Bed Mobility  Rolling: Independent  Supine to sit: Independent  Sit to supine: NA  Scooting: Independent  Independent   Transfers Sit to stand: SBA  Stand to sit: SBA  Stand pivot: SBA with ww TTWB RLE  Independent    Ambulation   40 feet with ww TTWB RLE SBA and VCs for sequence  150 feet with ww Independent TTWB RLE   Stair negotiation: ascended and descended NA  4 steps with 2 rails TTWB RLE Independent    AM-PAC 6 Clicks 98/56       BLE ROM is WFL, except L hip and knee FLEX limited at end ranges. LLE strength is grossly 4+/5 and R hip is 3-/5, knee 3/5 to 4-/5, and ankle is 4/5. Sensation:  Pt c/o neuropathy B feet and hands. Balance: sitting is Independent and standing with ww is SBA TTWB RLE  Endurance: fair    Patient education  Pt educated on hand placement during transfers and gait sequence with ww.      Patient response to education:   Pt verbalized understanding Pt demonstrated skill Pt requires further education in this area   yes yes yes     ASSESSMENT:    Conditions Requiring independence with functional mobility   [x] Balance Training to improve static/dynamic balance and to reduce fall risk  [x] Endurance Training to improve activity tolerance during functional mobility   [x] Transfer Training to improve safety and independence with all functional transfers   [x] Gait Training to improve gait mechanics, endurance and assess need for appropriate assistive device  [x] Stair Training in preparation for safe discharge home and/or into the community   [] Positioning to prevent skin breakdown and contractures  [] Safety and Education Training   [x] Patient/Caregiver Education   [x] HEP  [] Other     PT long term treatment goals are located in above grid    Frequency of treatments: 2-5x/week x 1-2 weeks. Time in  07:50  Time out  08:20    Total Treatment Time 15 minutes     Evaluation Time includes thorough review of current medical information, gathering information on past medical history/social history and prior level of function, completion of standardized testing/informal observation of tasks, assessment of data and education on plan of care and goals. CPT codes:  [x] Low Complexity PT evaluation 08654  [] Moderate Complexity PT evaluation 96911  [] High Complexity PT evaluation 51648  [] PT Re-evaluation 69749  [] Gait training 77757 ** minutes  [] Manual therapy 05256 ** minutes  [x] Therapeutic activities 14627 15 minutes  [] Therapeutic exercises 92219 ** minutes  [] Neuromuscular reeducation 98697 ** minutes     Remi Laughlin., P.T.   License Number: [id number]

## 2022-04-20 NOTE — PROGRESS NOTES
Physician Progress Note      PATIENT:               Hilary Garber  CSN #:                  [id number]  :                       1955  ADMIT DATE:       2022 3:45 PM  [address] [place] DATE:  RESPONDING  PROVIDER #:        AMANDA WORTHY DO          QUERY TEXT:    Pt admitted with right femur fracture. Pt noted to have altered mental status. If possible, please document in the progress notes and discharge summary if   you are evaluating and / or treating any of the following: The medical record reflects the following:  Risk Factors: recent surgery  Clinical Indicators: per nursing notes patient confused, disoriented x 4, and   per RRT note \". Renetta Naidu On arrival, pt given two doses of narcan with both   improvement of AMS but pt was still stuporous and was not talking. Renetta Dye Renetta Dye Pt   initially not moving or interacting. Once narcan given, pt moving to pain and   trying to interact but not talking. Later on, pt started answering questions   appropriately and cooperating. Renetta Dye Renetta Dye Encephalopathy, likely multifactorial,   narcotic administration in setting of pt with hx of TIA's. Narcotic use. Renetta Dye Renetta Dye \"  Treatment: IV Narcan x 2, IV fluids    Thank you,  Yanelis KCN, RN, CDIS  Clinical Documentation Improvement  [email or web address]  [phone]  Options provided:  -- Drug-induced encephalopathy due to, Please specify substance. -- Toxic encephalopathy  -- Other - I will add my own diagnosis  -- Disagree - Not applicable / Not valid  -- Disagree - Clinically unable to determine / Unknown  -- Refer to Clinical Documentation Reviewer    PROVIDER RESPONSE TEXT:    This patient has toxic encephalopathy.     Query created by: Ban Leon on 2022 2:36 PM      Electronically signed by:  Freda Francois DO 2022 4:02 PM

## 2022-04-20 NOTE — CARE COORDINATION
Updated plan of care. POD#1 right ORIF of femoral neck fx. Plan will be for home. Ordered pt wheeled walker and 3-1 commode. [facility] accepted and can follow-orders completed.  Will continue to junior

## 2022-04-20 NOTE — PROGRESS NOTES
[facility] Progress Note    --------------------------------------------------------------------------------------  Assessment / Plan  · Right femoral neck fracture  · Diabetes  · History hyperlipidemia, hypertension, arthritis, past TIA    Postop day 1 from ORIF for right femoral neck fracture. Continue to work with therapy, otherwise general management of the hip deferred to the orthopedic service. Patient is otherwise doing well, did have a bit of a neurologic event last night, more likely due to pain versus anesthesia effect, work-up was negative, and he is back to neurologic baseline. Seems to be breathing/swallowing just fine, can cancel speech evaluation. Please see orders for further plan of care.  Code status  full code   DVT prophylaxis held for surgery   Disposition  to be determined  --------------------------------------------------------------------------------------    Admission Date  4/18/2022  3:45 PM  Chief Complaint Fall    Subjective  History of Present Illness  Josselyn was seen sitting up in his chair next to his bed this morning, nursing staff present in the room. Overnight events noted, there was some concern for CVA though ultimately no findings on work-up and patient noted he had a similar episode when he had his knee done. He is back to his baseline, says he generally feels well, even working with therapy, though he does have some pain in the right proximal thigh. No trouble swallowing, breathing, or speaking. No shortness of breath, fevers, chills, or other new issues were identified. Case discussed with the patient's nurse and the charge nurse.     Review of Systems - 12-point review of systems has been reviewed and is otherwise negative except as listed in the HPI    Objective  Physical Exam  Vitals: BP (!) 145/77   Pulse 108   Temp 97.8 °F (36.6 °C) (Oral)   Resp 18   Ht 6' 4\" (1.93 m)   Wt 233 lb (105.7 kg)   SpO2 96%   BMI 28.36 kg/m²   General: well-developed, well-nourished, no acute distress, cooperative  Skin: warm, dry, intact, normal color without cyanosis  HEENT: normocephalic, atraumatic, mucous membranes normal  Respiratory: clear to auscultation bilaterally without respiratory distress  Cardiovascular: regular rate and rhythm without murmur / rub / gallop  Abdominal: soft, nontender, nondistended, normoactive bowel sounds  Extremities: no mottling, pulses intact, no edema  Neurologic: awake, alert, no focal deficits  Psychiatric: normal affect, cooperative    Electronically signed by Gabreille Mike DO on 4/20/2022 at 9:55 AM

## 2022-04-20 NOTE — PROGRESS NOTES
Physician Progress Note      PATIENT:               Gracie Ryan  CSN #:                  [id number]  :                       1955  ADMIT DATE:       2022 3:45 PM  [address] [place] DATE:  RESPONDING  PROVIDER #:        AMANDA WORTHY DO          QUERY TEXT:    Pt admitted with fracture of right femur. Pt noted to have drop in H&H post   ORIF. If possible, please document in the progress notes and discharge summary   if you are evaluating and/or treating any of the following: The medical record reflects the following:  Risk Factors: ORIF right femur  Clinical Indicators: Pre op H&H 15.0/44.7 , Post op H&H 12.1/35.8, and per Op   note \". Sarmad Bañuelos Ra Estimated Blood Loss (mL): Approximately 100 cc. Sarmad Bañuelos Ra \"  Treatment: serial lab monitoring    Thank you,  Ani SALINAS, RN, CDIS  Clinical Documentation Improvement  [email or web address]  [phone]  Options provided:  -- Postoperative acute blood loss anemia  -- Precipitous drop in Hemoglobin and Hematocrit  -- Other - I will add my own diagnosis  -- Disagree - Not applicable / Not valid  -- Disagree - Clinically unable to determine / Unknown  -- Refer to Clinical Documentation Reviewer    PROVIDER RESPONSE TEXT:    This patient has postoperative acute blood loss anemia. Query created by: Denia Felipe on 2022 10:53 AM      QUERY TEXT:    Pt admitted with right femur fracture. Pt noted to have altered mental status. If possible, please document in the progress notes and discharge summary if   you are evaluating and / or treating any of the following: The medical record reflects the following:  Risk Factors: recent surgery  Clinical Indicators: per nursing notes patient confused, disoriented x 4, and   per RRT note \". Sarmad Bañuelos Ra On arrival, pt given two doses of narcan with both   improvement of AMS but pt was still stuporous and was not talking. Sarmad Bauñelos Ra Pt   initially not moving or interacting.  Once narcan given, pt moving to pain and   trying to interact but not talking. Later on, pt started answering questions   appropriately and cooperating. Hafsaa Daymartin Pereyraa Daymartin Encephalopathy, likely multifactorial,   narcotic administration in setting of pt with hx of TIA's. Narcotic use. Bula Dayhoff Bula Dayhoff \"  Treatment: IV Narcan x 2, IV fluids    Thank you,  Tamica SALINAS, RN, CDIS  Clinical Documentation Improvement  [email or web address]  [phone]  Options provided:  -- Drug-induced encephalopathy due to, Please specify substance.   -- Toxic encephalopathy  -- Other - I will add my own diagnosis  -- Disagree - Not applicable / Not valid  -- Disagree - Clinically unable to determine / Unknown  -- Refer to Clinical Documentation Reviewer    PROVIDER RESPONSE TEXT:    Likely anesthetic effect    Query created by: Tom Gutierrez on 4/20/2022 11:13 AM      Electronically signed by:  Brando Diego DO 4/20/2022 11:21 AM

## 2022-04-20 NOTE — SIGNIFICANT EVENT
RRT note    CTSP for AMS. Pt just came up from PACU. Blood sugar checked and VS as well. On arrival, pt given two doses of narcan with both improvement of AMS but pt was still stuporous and was not talking      PHYSICAL EXAM:    Vitals:  BP (!) 142/72   Pulse 94   Temp 97.9 °F (36.6 °C) (Axillary)   Resp 21   Ht 6' 4\" (1.93 m)   Wt 233 lb (105.7 kg)   SpO2 99%   BMI 28.36 kg/m²     General:  Appears uncomfortable. Pt initially not moving or interacting. Once narcan given, pt moving to pain and trying to interact but not talking. Later on, pt started answering questions appropriately and cooperating. HEENT:  Mucous membranes moist. No erythema, rhinorrhea, or post-nasal drip noted. Neck:  No carotid bruits. Heart:  Rhythm regular at rate of 96  Lungs:  CTA. No wheeze, rales, or rhonchi  Abdomen:  Positive bowel sounds positive. Soft. Non-tender. No guarding, rebound or rigidity. Breast/Rectal/Genitourinary: not pertinent. Extremities:  Negative for lower extremity edema  Skin:  Warm and dry  Vascular: 2/4 Dorsalis Pedis pulses bilaterally. Neuro:  No focal changes. Pt was not talking initially      Further workup ordered including cxr for persistent cough. Pt had hx of tia's. Pt was grabbing his head and family states he has headaches. Escorted pt to ct scanner and back    Called radiologist who confirmed no acute findings. Decision made after pt appearing aphasic despite no other observable deficits to call access center for telestroke which monitor not available. While waiting for neuro to call back, family stated that pt can have the ability to not interact when in pain, as well as just prior to neuro calling back that pt started talking. Discussed with neuro and did not feel that findings consistent with acute neuro event and continue with what I am doing. On follow up from workup, did order cta to look for PE vs atelectasis vs infiltrate.      Encephalopathy, likely multifactorial, narcotic administration in setting of pt with hx of TIA's  Narcotic use   Acidosis  Dm type 2 uncontrolled  htn  Dehydration iv fluids given with pt having surgery and taking invokana as outpt which can cause polyuria.        Critical care time is 95 min

## 2022-04-20 NOTE — PROGRESS NOTES
SPEECH LANGUAGE PATHOLOGY  DAILY PROGRESS NOTE        PATIENT NAME:  Trever Padilla. :  1955          TODAY'S DATE:  2022 ROOM:  [address] [place] [address]      Order received for bedside swallow evaluation. SLP waiting outside patient's room to complete evaluation while Dr was speaking with patient. Spoke with Dr who recommended canceling speech consult as patient is swallowing without s/s of aspiration. RN to cancel the order. Please re-consult as warranted. Thank you. Iraj Kidd [id number]  Speech Language Pathologist

## 2022-04-20 NOTE — PROGRESS NOTES
Patient arrived back to assigned room after R ORIF of hip around 1950 on 4/19. RN went in to assess patient, release orders, check vitals and blood glucose when he arrived on the floor. When RN entered the room, patient was aphasic, unable to respond to commands, showing nystagmus bilaterally, and did not initially move his left upper extremity to painful stim. At baseline, patient is alert and oriented x4, follows all commands, and only has some slight neuropathy to his arms (mostly in his hands per the patient's previous report with RN). Charge RN called RRT. Treatment team took a set of vitals, checked BG, hooked patient up to code cart EKG, and floor EKG. Dr. Jamia Esposito was at bedside and ordered patient x2 doses of narcan to reverse possible side effects of fentanyl given post-op. Patient taken down to stat head CT. Labs ordered and drawn by RNs. Patient with new strong, persistent cough after RRT. Dr. Jamia Esposito notified, placed orders for additional labs and chest CT. Dr. Abida Lucero, on-call for ortho at this time, notified by the RN with an update on the patient's current status and what had occurred during the night. Dr. Abida Lucero advised RN to ensure that Dr. Jennifer Atkinson was also notified this AM. RN to pass along in report to on-coming treatment team. At this time, patient is now back to baseline from a neuro standpoint per RN's previous assessment.

## 2022-04-20 NOTE — PROGRESS NOTES
Bedside swallow eval done; no coughing/other signs aspiration, took pills well.  Spoke to Dr. Huitron Staff no concerns for aspiration okay to d/c speech consult

## 2022-04-20 NOTE — PROGRESS NOTES
[facility] Quality Flow/Interdisciplinary Rounds Progress Note        Quality Flow Rounds held on April 20, 2022    Disciplines Attending:  Bedside Nurse, ,  and Nursing Unit [place] [id number]. was admitted on 4/18/2022  3:45 PM    Anticipated Discharge Date:  Expected Discharge Date: 05/21/22    Disposition:    Regulo Score:  Regulo Scale Score: 21    Readmission Risk              Risk of Unplanned Readmission:  17           Discussed patient goal for the day, patient clinical progression, and barriers to discharge.   The following Goal(s) of the Day/Commitment(s) have been identified:  Discharge [address] ROSE MARY Mello  April 20, 2022

## 2022-04-20 NOTE — PROGRESS NOTES
Department of Orthopedic Surgery  Trauma / Fracture Care   Attending Progress Note        Subjective: Patient sitting up in bed. No complaints at this time. Apparently, he did have several events overnight with concern for CVA. However, he states he is now returned to his baseline. He has mild pain in the lateral aspect of the thigh but his pain in the groin is improved following surgery. He denies fevers, chills, nausea or vomiting.     Vitals  VITALS:  /69   Pulse 105   Temp 98.4 °F (36.9 °C) (Oral)   Resp 18   Ht 6' 4\" (1.93 m)   Wt 233 lb (105.7 kg)   SpO2 94%   BMI 28.36 kg/m²     PHYSICAL EXAM:    Orientation:  alert and oriented to person, place and time    Right Lower Extremity    Compartments: soft      Incision:  dressing in place, clean, dry and intact    Upper extremity Motor: 5/5 axillary, mc,m,r,u,ain,pin    Upper extremity sensory: grossly in tact    Lower Extremity Motor :   Quadriceps:  5/5  Extensor hallucis:  5/5  Dorsiflexion:  5/5  Plantarflexion:  5/5    Lower Extremity Sensory:  Tibial Nerve:  intact  Superficial Peroneal Nerve:  intact  Deep Peroneal Nerve:  intact    Pulses:    Posterior Tibial:  2  Dorsalis Pedis:  2  Posterior Tibial Artery:  2    Abnormal Exam findings:  none      LABS:    HgB:    Lab Results   Component Value Date    HGB 10.0 8/28/2011     INR:    No components found with this basename: PTPATIENT, PTINR     CBC:   Lab Results   Component Value Date    WBC 6.5 04/20/2022    RBC 3.94 04/20/2022    HGB 12.1 04/20/2022    HCT 35.8 04/20/2022    MCV 90.9 04/20/2022    MCH 30.7 04/20/2022    MCHC 33.8 04/20/2022    RDW 13.1 04/20/2022     04/20/2022    MPV 8.8 04/20/2022       ASSESSMENT AND PLAN:    Post operative day 1 status post right hip ORIF    1:  Touch down weight bearing  2:  Deep venous thrombosis prophylaxis -Lovenox 40 daily  3:  Continue physical therapy  4:  D/C Plan: Home  5:  F/U Plan: 2 weeks, my office              6: Patient is doing well this afternoon. There was concern earlier this morning for possible CVA following surgery versus aspiration. However, subsequent testing has proven negative. Patient states that he had a similar episode that occurred after his total knee replacement several years ago. This too resolve spontaneously. Nursing reports that he had nystagmus with weakness on his left side. However, his neurovascular function was found to be normal today. He is awake, alert and oriented and demonstrates no residual ill effects from my standpoint. He is doing well following his right hip surgery. He needs to be touchdown weightbearing and he verbalized understanding with this. He is stable from orthopedic standpoint.   Plan to follow-up in my office in 2 weeks    Daina Wagner DO  4/20/2022  2:32 PM

## 2022-04-20 NOTE — PROGRESS NOTES
Dr. Brenda Ojeda, DO,    Your patient is on a medication that requires a renal and/or weight dose adjustment. Renal/Body Weight Function Assessment:    Date Body Weight IBW  Adjusted BW SCr  CrCl Dialysis status   4/20/2022 233 lb (105.7 kg) Ideal body weight: 86.8 kg (191 lb 5.7 oz)  Adjusted ideal body weight: 94.4 kg (208 lb 0.2 oz) Serum creatinine: 0.8 mg/dL 04/20/22 0200  Estimated creatinine clearance: 123 mL/min N/a       Pharmacy has dose-adjusted the following medication(s):    Date Previous Order Adjusted Order   4/20/2022 Enoxaparin 40 mg daily Enoxaparin 30 mg twice daily       These changes were made per protocol according to the [facility] Renal Dosing Policy/ [facility] Pharmacist Anticoagulant Review. *Please note this dose may need readjusted if your patient's condition changes. Please contact pharmacy with any questions regarding these changes.     greyson starks [facility]  4/20/2022  1:39 PM

## 2022-04-20 NOTE — PROGRESS NOTES
Occupational Therapy  OCCUPATIONAL THERAPY INITIAL EVALUATION    [facility] [place] [facility], [place]        Date:2022                                                  Patient Name: Shadi Lane MRN: [id number]    : 1955    Room: [address] [place] [address]      Evaluating OT: Charo Vidal OTR/L [id number]      Referring Provider: Torrey Fournier DO    Specific Provider Orders/Date: OT eval and treat 22      Diagnosis: Closed fracture of neck of right femur, initial encounter ([facility] Erling Baton  Fall, initial encounter [W19. XXXA]  Closed right hip fracture, initial encounter ([facility] [S72.001A]  Acute pain of left knee [M25.562]     Surgery: R hip ORIF 22     Pertinent Medical History: DM, HTN, OA, TIA      Precautions:  Fall Risk, TTWB RLE     Assessment of current deficits    [x] Functional mobility  [x]ADLs  [x] Strength               []Cognition    [x] Functional transfers   [x] IADLs         [x] Safety Awareness   [x]Endurance    [] Fine Coordination              [x] Balance      [] Vision/perception   []Sensation     []Gross Motor Coordination  [] ROM  [] Delirium                   [] Motor Control     OT PLAN OF CARE   OT POC based on physician orders, patient diagnosis and results of clinical assessment    Frequency/Duration 2-5 days/wk for 2 weeks PRN   Specific OT Treatment Interventions to include:   * Instruction/training on adapted ADL techniques and AE recommendations to increase functional independence within precautions       * Training on energy conservation strategies, correct breathing pattern and techniques to improve independence/tolerance for self-care routine  * Functional transfer/mobility training/DME recommendations for increased independence, safety, and fall prevention  * Patient/Family education to increase follow through with safety techniques and functional independence  * Recommendation of environmental modifications for increased safety with functional transfers/mobility and ADLs  * Therapeutic exercise to improve motor endurance, ROM, and functional strength for ADLs/functional transfers  * Therapeutic activities to facilitate/challenge dynamic balance, stand tolerance for increased safety and independence with ADLs        Recommended Adaptive Equipment: BSC    Home Living: Pt lives with wife in 1 story house with ramp entry. Bathroom setup: tub/shower with extended tub bench and grab bars, standard height commode   Equipment owned: cane, shower chair    Prior Level of Function: independent with ADLs , independent with IADLs; functional mobility: cane    Pain Level: Pt reported 3/10 pain in R hip; pt agreeable to therapy  Cognition: A&O: 4/4; WFL command follow demonstrated. Pt pleasant and motivated.    Memory:  fair   Sequencing:  fair   Problem solving:  fair   Judgement/safety:  fair     Functional Assessment:  AM-PAC Daily Activity Raw Score: 17/24   Initial Eval Status  Date: 4/20/22 Treatment Status  Date: STGs = LTGs  Time frame: 10-14 days   Feeding Independent      Grooming SBA  To complete hand hygiene standing at sink  Mod I/I   UB Dressing SBA  Mod I/I   LB Dressing Mod A  To adjust socks   Mod I/I-with use of AD as appropriate/needed   Bathing Mod A  Mod I/I -with use of AD as appropriate/needed   Toileting Min A  For clothing management  Mod I/I    Bed Mobility  Pt in chair   Independent    Functional Transfers SBA with wheeled walker  Sit<>Stand from chair  Sit<>stand from [facility] over commode  Cues for hand placement and safe technique and wheeled walker use  Independent     Functional Mobility SBA with wheeled walker  To and from bathroom  Cues for sequencing and safe wheeled walker use and to maintain TTWB  Independent -with device as needed to maximize independence with ADLs and functional task completion and good adherence to WB status   Balance Sitting:     Static:  Independent Dynamic:SBA  Standing: SBA with wheeled walker  I for dynamic sitting balance to maximize independence with ADLs and functional task completion    I for standing balance to maximize independence with ADLs and functional task completion   Activity Tolerance Fair+ with light activity  Good with ADL activity. Pt will demonstrate good understanding of education provided on EC/WS techniques    Visual/  Perceptual Glasses: none at bedside                Additional long-term goal: Pt will increase functional independence to PLOF to allow pt to live in least restrictive environment. Hand Dominance R   AROM (PROM) Strength Additional Info:    RUE  WFL 4/5 good  and wfl FMC/dexterity noted during ADL tasks and functional transfers       LUE WFL 4/5 good  and wfl FMC/dexterity noted during ADL tasks and functional transfers       Hearing: JANINE/[facility]   Sensation:  No c/o numbness or tingling   Tone: WFL   Edema: RLE    Comments: RN okayed evaluation this date. Upon arrival patient sitting in chair. At end of session, patient returned to chair with call light and phone within reach, all lines and tubes intact. Overall patient demonstrated decreased independence and safety during completion of ADL/functional transfer/mobility tasks. Pt would benefit from continued skilled OT to increase safety and independence with completion of ADL/IADL tasks for functional independence and quality of life. Rehab Potential: Good for established goals     Patient / Family Goal: none stated    Patient and/or family were instructed on functional diagnosis, prognosis/goals and OT plan of care. Demonstrated fair understanding.      Eval Complexity: Low    Time In: 0815  Time Out: 8834    Min Units   OT Eval Low 97165  x  1   OT Eval Medium 04817      OT Eval High 62592      OT Re-Eval [id number]       Therapeutic Ex [id number]       Therapeutic Activities 80503       ADL/Self Care 49762       Orthotic Management 84791       Manual [id number]     Neuro Re-Ed 00075       Non-Billable Time          Evaluation Time additionally includes thorough review of current medical information, gathering information on past medical history/social history and prior level of function, interpretation of standardized testing/informal observation of tasks, assessment of data and development of plan of care and goals.             George Lynn, OTR/L, [id number]

## 2022-04-21 VITALS
HEART RATE: 89 BPM | DIASTOLIC BLOOD PRESSURE: 66 MMHG | WEIGHT: 233 LBS | SYSTOLIC BLOOD PRESSURE: 109 MMHG | TEMPERATURE: 98.2 F | OXYGEN SATURATION: 91 % | RESPIRATION RATE: 18 BRPM | BODY MASS INDEX: 28.37 KG/M2 | HEIGHT: 76 IN

## 2022-04-21 LAB
ALBUMIN SERPL-MCNC: 4 G/DL (ref 3.5–5.2)
ALP BLD-CCNC: 64 U/L (ref 40–129)
ALT SERPL-CCNC: 40 U/L (ref 0–40)
ANION GAP SERPL CALCULATED.3IONS-SCNC: 10 MMOL/L (ref 7–16)
AST SERPL-CCNC: 35 U/L (ref 0–39)
BASOPHILS ABSOLUTE: 0.03 E9/L (ref 0–0.2)
BASOPHILS RELATIVE PERCENT: 0.4 % (ref 0–2)
BILIRUB SERPL-MCNC: 0.4 MG/DL (ref 0–1.2)
BUN BLDV-MCNC: 15 MG/DL (ref 6–23)
CALCIUM SERPL-MCNC: 9.3 MG/DL (ref 8.6–10.2)
CHLORIDE BLD-SCNC: 106 MMOL/L (ref 98–107)
CO2: 23 MMOL/L (ref 22–29)
CREAT SERPL-MCNC: 0.7 MG/DL (ref 0.7–1.2)
EOSINOPHILS ABSOLUTE: 0.13 E9/L (ref 0.05–0.5)
EOSINOPHILS RELATIVE PERCENT: 1.9 % (ref 0–6)
GFR AFRICAN AMERICAN: >60
GFR NON-AFRICAN AMERICAN: >60 ML/MIN/1.73
GLUCOSE BLD-MCNC: 182 MG/DL (ref 74–99)
HCT VFR BLD CALC: 38.7 % (ref 37–54)
HEMOGLOBIN: 12.8 G/DL (ref 12.5–16.5)
IMMATURE GRANULOCYTES #: 0.02 E9/L
IMMATURE GRANULOCYTES %: 0.3 % (ref 0–5)
LYMPHOCYTES ABSOLUTE: 2.27 E9/L (ref 1.5–4)
LYMPHOCYTES RELATIVE PERCENT: 32.5 % (ref 20–42)
MAGNESIUM: 1.9 MG/DL (ref 1.6–2.6)
MCH RBC QN AUTO: 30.3 PG (ref 26–35)
MCHC RBC AUTO-ENTMCNC: 33.1 % (ref 32–34.5)
MCV RBC AUTO: 91.7 FL (ref 80–99.9)
METER GLUCOSE: 156 MG/DL (ref 74–99)
METER GLUCOSE: 231 MG/DL (ref 74–99)
MONOCYTES ABSOLUTE: 0.64 E9/L (ref 0.1–0.95)
MONOCYTES RELATIVE PERCENT: 9.2 % (ref 2–12)
NEUTROPHILS ABSOLUTE: 3.89 E9/L (ref 1.8–7.3)
NEUTROPHILS RELATIVE PERCENT: 55.7 % (ref 43–80)
PDW BLD-RTO: 13.2 FL (ref 11.5–15)
PLATELET # BLD: 220 E9/L (ref 130–450)
PMV BLD AUTO: 9.1 FL (ref 7–12)
POTASSIUM SERPL-SCNC: 4.5 MMOL/L (ref 3.5–5)
RBC # BLD: 4.22 E12/L (ref 3.8–5.8)
SODIUM BLD-SCNC: 139 MMOL/L (ref 132–146)
TOTAL PROTEIN: 6.6 G/DL (ref 6.4–8.3)
WBC # BLD: 7 E9/L (ref 4.5–11.5)

## 2022-04-21 PROCEDURE — 6370000000 HC RX 637 (ALT 250 FOR IP): Performed by: INTERNAL MEDICINE

## 2022-04-21 PROCEDURE — 82962 GLUCOSE BLOOD TEST: CPT

## 2022-04-21 PROCEDURE — 6360000002 HC RX W HCPCS: Performed by: GENERAL ACUTE CARE HOSPITAL

## 2022-04-21 PROCEDURE — 97530 THERAPEUTIC ACTIVITIES: CPT

## 2022-04-21 PROCEDURE — 99232 SBSQ HOSP IP/OBS MODERATE 35: CPT | Performed by: INTERNAL MEDICINE

## 2022-04-21 PROCEDURE — 2580000003 HC RX 258: Performed by: GENERAL ACUTE CARE HOSPITAL

## 2022-04-21 PROCEDURE — 83735 ASSAY OF MAGNESIUM: CPT

## 2022-04-21 PROCEDURE — 6370000000 HC RX 637 (ALT 250 FOR IP): Performed by: GENERAL ACUTE CARE HOSPITAL

## 2022-04-21 PROCEDURE — 6370000000 HC RX 637 (ALT 250 FOR IP): Performed by: NURSE PRACTITIONER

## 2022-04-21 PROCEDURE — 85025 COMPLETE CBC W/AUTO DIFF WBC: CPT

## 2022-04-21 PROCEDURE — 36415 COLL VENOUS BLD VENIPUNCTURE: CPT

## 2022-04-21 PROCEDURE — 80053 COMPREHEN METABOLIC PANEL: CPT

## 2022-04-21 RX ADMIN — ENOXAPARIN SODIUM 30 MG: 100 INJECTION SUBCUTANEOUS at 08:56

## 2022-04-21 RX ADMIN — ASPIRIN 162 MG: 325 TABLET ORAL at 08:56

## 2022-04-21 RX ADMIN — INSULIN LISPRO 4 UNITS: 100 INJECTION, SOLUTION INTRAVENOUS; SUBCUTANEOUS at 11:40

## 2022-04-21 RX ADMIN — OMEGA-3-ACID ETHYL ESTERS 1 G: 1 CAPSULE, LIQUID FILLED ORAL at 08:56

## 2022-04-21 RX ADMIN — INSULIN LISPRO 2 UNITS: 100 INJECTION, SOLUTION INTRAVENOUS; SUBCUTANEOUS at 08:05

## 2022-04-21 RX ADMIN — OXYCODONE 5 MG: 5 TABLET ORAL at 02:24

## 2022-04-21 RX ADMIN — SODIUM CHLORIDE, PRESERVATIVE FREE 10 ML: 5 INJECTION INTRAVENOUS at 09:01

## 2022-04-21 RX ADMIN — POLYETHYLENE GLYCOL 3350 17 G: 17 POWDER, FOR SOLUTION ORAL at 09:01

## 2022-04-21 RX ADMIN — OXYCODONE 5 MG: 5 TABLET ORAL at 09:10

## 2022-04-21 ASSESSMENT — PAIN DESCRIPTION - DESCRIPTORS
DESCRIPTORS: ACHING;DISCOMFORT;TENDER
DESCRIPTORS: SHARP;PINS AND NEEDLES
DESCRIPTORS: JABBING;SHARP

## 2022-04-21 ASSESSMENT — PAIN SCALES - GENERAL
PAINLEVEL_OUTOF10: 3
PAINLEVEL_OUTOF10: 4
PAINLEVEL_OUTOF10: 3
PAINLEVEL_OUTOF10: 3
PAINLEVEL_OUTOF10: 2
PAINLEVEL_OUTOF10: 4

## 2022-04-21 ASSESSMENT — PAIN DESCRIPTION - PAIN TYPE
TYPE: ACUTE PAIN;SURGICAL PAIN;NEUROPATHIC PAIN
TYPE: ACUTE PAIN;SURGICAL PAIN

## 2022-04-21 ASSESSMENT — PAIN DESCRIPTION - FREQUENCY
FREQUENCY: INTERMITTENT
FREQUENCY: CONTINUOUS

## 2022-04-21 ASSESSMENT — PAIN DESCRIPTION - ORIENTATION
ORIENTATION: RIGHT

## 2022-04-21 ASSESSMENT — PAIN DESCRIPTION - LOCATION
LOCATION: HIP
LOCATION: HIP
LOCATION: HIP;LEG

## 2022-04-21 ASSESSMENT — PAIN DESCRIPTION - ONSET
ONSET: ON-GOING
ONSET: ON-GOING

## 2022-04-21 ASSESSMENT — PAIN - FUNCTIONAL ASSESSMENT: PAIN_FUNCTIONAL_ASSESSMENT: ACTIVITIES ARE NOT PREVENTED

## 2022-04-21 NOTE — DISCHARGE SUMMARY
[address] Physician Discharge Summary        [address] [facility] [address]  [address] [facility] [address]  [phone]          Activity level: Touch down weight bearing    Dispo: Home     Condition on discharge: Stable    Patient ID:  Victoria Mojica  [id number]  73 y.o.  1955    Admit date: 4/18/2022    Discharge date and time:  4/21/2022  11:58 AM    Admission Diagnoses: Principal Problem:    Closed right hip fracture, initial encounter [facility])  Active Problems:    Encephalopathy    Uncontrolled type 2 diabetes mellitus with hyperglycemia (HCC)    Acidosis    Primary hypertension    Diabetes mellitus ([facility]    TIA (transient ischemic attack)  Resolved Problems:    * No resolved hospital problems. *      Discharge Diagnoses: Principal Problem:    Closed right hip fracture, initial encounter [facility])  Active Problems:    Encephalopathy    Uncontrolled type 2 diabetes mellitus with hyperglycemia (HCC)    Acidosis    Primary hypertension    Diabetes mellitus ([facility]    TIA (transient ischemic attack)  Resolved Problems:    * No resolved hospital problems. *      Consults:  IP CONSULT TO ORTHOPEDIC SURGERY  IP CONSULT TO SOCIAL WORK  IP CONSULT TO CASE MANAGEMENT    Hospital Course:   Patient Victoria Boyd is a 73 y.o. presented with Closed fracture of neck of right femur, initial encounter ([facility] Milfay Meline  Fall, initial encounter [W19. XXXA]  Closed right [facility] fracture, [facility] [S72.001A]  Acute pain of left knee [M25.562]    Patient is 72 yo M with PMH TIA who presented to the ED after he had a mechanical fall from standing height 4 days POA. This caused him to fall directly onto his right hip. He was able to continue bearing weight but had increased pain. Work up in the ED revealed evidence of a nondisplaced valgus impacted right femoral neck fracture.   He was subsequently admitted for medical management, orthopedics surgery has been consulted and heriberto had undergone ORIF of the right hip. He continued to do well after surgery and will be discharged home in a stable condition. To be followed by orthopedics as outpatient. Discharge Exam:  General: well-developed, well-nourished, no acute distress, cooperative  Skin: warm, dry, intact, normal color without cyanosis  HEENT: normocephalic, atraumatic, mucous membranes normal  Respiratory: clear to auscultation bilaterally without respiratory distress  Cardiovascular: regular rate and rhythm without murmur / rub / gallop  Abdominal: soft, nontender, nondistended, normoactive bowel sounds  Extremities: no mottling, pulses intact, no edema  Neurologic: awake, alert, no focal deficits  Psychiatric: normal affect, cooperative  I/O last 3 completed shifts: In: 0   Out: 1300 [Urine:1300]  I/O this shift:  In: -   Out: 320 [Urine:320]      LABS:  Recent Labs     04/19/22 2119 04/20/22 0200 04/21/22  0354    137 139   K 4.5 4.0 4.5    106 106   CO2 19* 18* 23   BUN 17 18 15   CREATININE 0.8 0.8 0.7   GLUCOSE 226* 278* 182*   CALCIUM 9.7 7.9* 9.3       Recent Labs     04/19/22 2119 04/20/22 0200 04/21/22  0354   WBC 8.2 6.5 7.0   RBC 4.94 3.94 4.22   HGB 15.0 12.1* 12.8   HCT 44.7 35.8* 38.7   MCV 90.5 90.9 91.7   MCH 30.4 30.7 30.3   MCHC 33.6 33.8 33.1   RDW 13.2 13.1 13.2    210 220   MPV 8.8 8.8 9.1       No results for input(s): POCGLU in the last 72 hours. Imaging:  XR CHEST (2 VW)    Result Date: 4/18/2022  EXAMINATION: TWO XRAY VIEWS OF THE CHEST 4/18/2022 5:38 pm COMPARISON: None. HISTORY: ORDERING SYSTEM PROVIDED HISTORY: pre-op TECHNOLOGIST PROVIDED HISTORY: Reason for exam:->pre-op FINDINGS: The lungs are without acute focal process. There is no effusion or pneumothorax. The cardiomediastinal silhouette is without acute process. The osseous structures are without acute process. No acute process. XR HIP BILATERAL W AP PELVIS (2 VIEWS)    Result Date: 4/18/2022  EXAMINATION: ONE XRAY VIEW OF THE PELVIS AND TWO XRAY VIEWS OF EACH OF THE BILATERAL HIPS 4/18/2022 4:10 pm COMPARISON: None. HISTORY: ORDERING SYSTEM PROVIDED HISTORY: right groin and hip pain TECHNOLOGIST PROVIDED HISTORY: Reason for exam:->right groin and hip pain FINDINGS: There is a mildly displaced subcapital right femoral fracture. CT scan of the right hip can be performed for further, more detailed evaluation. There is no evidence of dislocation. No other fracture is seen. Osteo-degenerative changes are identified involving the visualized lower lumbar spine. Small bilateral iliac crest enthesopathies are noted. No other significant osseous or surrounding soft tissue abnormality is appreciated. Mildly displaced subcapital right femoral fracture. CT scan of the right hip can be performed for further, more detailed evaluation. Osteo-degenerative changes, as described above. XR KNEE RIGHT (3 VIEWS)    Result Date: 4/18/2022  EXAMINATION: THREE XRAY VIEWS OF THE RIGHT KNEE 4/18/2022 4:10 pm COMPARISON: None. HISTORY: ORDERING SYSTEM PROVIDED HISTORY: fall TECHNOLOGIST PROVIDED HISTORY: Reason for exam:->fall FINDINGS: There are moderate tricompartmental arthritic changes. Subtle cortical depression noted along the articulating surface of the lateral femoral condyle, possibly due to old injury. There are no acute fractures or dislocations. There are multiple calcified nodules posterior to the knee. Minimal suprapatellar soft tissue swelling. Moderate tricompartmental osteoarthritis. No acute bony abnormalities. Question small joint effusion. XR ANKLE RIGHT (MIN 3 VIEWS)    Result Date: 4/18/2022  EXAMINATION: THREE XRAY VIEWS OF THE RIGHT ANKLE 4/18/2022 4:10 pm COMPARISON: None.  HISTORY: ORDERING SYSTEM PROVIDED HISTORY: fall TECHNOLOGIST PROVIDED HISTORY: Reason for exam:->fall FINDINGS: No evidence of acute fracture or dislocation. Normal alignment of the ankle mortise. No focal osseous lesion. No evidence of joint effusion. No focal soft tissue abnormality. No acute abnormality of the ankle. Small plantar calcaneal spur. CT HEAD WO CONTRAST    Result Date: 4/19/2022  EXAMINATION: CT OF THE HEAD WITHOUT CONTRAST  4/19/2022 8:46 pm TECHNIQUE: CT of the head was performed without the administration of intravenous contrast. Dose modulation, iterative reconstruction, and/or weight based adjustment of the mA/kV was utilized to reduce the radiation dose to as low as reasonably achievable. COMPARISON: July 19, 2019 HISTORY: ORDERING SYSTEM PROVIDED HISTORY: Mentation changes post-op TECHNOLOGIST PROVIDED HISTORY: Reason for exam:->Mentation changes post-op Has a \"code stroke\" or \"stroke alert\" been called? ->No FINDINGS: BRAIN/VENTRICLES: There is no acute intracranial hemorrhage, mass effect or midline shift. No abnormal extra-axial fluid collection. The gray-white differentiation is maintained without evidence of an acute infarct. There is no evidence of hydrocephalus. ORBITS: The visualized portion of the orbits demonstrate no acute abnormality. SINUSES: The visualized paranasal sinuses and mastoid air cells demonstrate no acute abnormality. SOFT TISSUES/SKULL:  No acute abnormality of the visualized skull or soft tissues. No acute intracranial abnormality. XR CHEST PORTABLE    Result Date: 4/19/2022  EXAMINATION: ONE XRAY VIEW OF THE CHEST 4/19/2022 9:17 pm COMPARISON: Chest series from April 18, 2022 HISTORY: ORDERING SYSTEM PROVIDED HISTORY: altered mental status r/o infiltrate/aspiration TECHNOLOGIST PROVIDED HISTORY: Reason for exam:->altered mental status r/o infiltrate/aspiration FINDINGS: Medical support devices: None Lungs: There are new ill-defined and subsegmental opacities in the mid and lower lungs. No obvious pleural effusion or pneumothorax.  Cardiomediastinal silhouette and pulmonary vascularity: Atherosclerotic disease in the aortic arch. Pulmonary vascularity appears increased. Osseous and soft tissue structures: No acute findings. 1.  New subsegmental and ill-defined opacities in the mid and lower lungs. Broad differential which includes infection. Please correlate with clinical presentation. 2.  Atherosclerotic disease. Pulmonary vascularity appears slightly increased. RECOMMENDATION: Continued radiographic follow-up suggested. CT HIP RIGHT WO CONTRAST    Result Date: 4/18/2022  EXAMINATION: CT OF THE RIGHT HIP WITHOUT CONTRAST 4/18/2022 6:41 pm TECHNIQUE: CT of the right hip was performed without the administration of intravenous contrast.  Multiplanar reformatted images are provided for review. Dose modulation, iterative reconstruction, and/or weight based adjustment of the mA/kV was utilized to reduce the radiation dose to as low as reasonably achievable. COMPARISON: Correlation made radiograph performed today. HISTORY ORDERING SYSTEM PROVIDED HISTORY: fx right femoral neck on xray imaging TECHNOLOGIST PROVIDED HISTORY: Reason for exam:->fx right femoral neck on xray imaging Decision Support Exception - unselect if not a suspected or confirmed emergency medical condition->Emergency Medical Condition (MA) FINDINGS: Mildly displaced right femoral neck fracture. Femoral head maintains articulation with the acetabulum. No significant synovial effusion. No loose intra-articular fracture fragment. Mild osteophytosis along superior margin of right acetabulum. No evidence of fracture involving visualized right aspect of the pelvis. No free fluid collections in visualized pelvis. Mildly displaced subcapital right femoral neck fracture. FLUORO FOR SURGICAL PROCEDURES    Result Date: 4/19/2022  EXAMINATION: SPOT FLUOROSCOPIC IMAGES 4/19/2022 4:56 pm TECHNIQUE: Fluoroscopy was provided by the radiology department for procedure. Radiologist was not present during examination. FLUOROSCOPY DOSE AND TYPE OR TIME AND EXPOSURES: 3 images FLUOROSCOPY TIME: Not provided COMPARISON: Bilateral hip series from April 18, 2022 HISTORY: [address] [facility] [address]: right hip orif TECHNOLOGIST PROVIDED HISTORY: Reason for exam:->right hip orif Intraprocedural imaging. FINDINGS: 3 spot images of the right hip were obtained. Fluoroscopic support provided to subspecialty service for ORIF of the right hip. No obvious complication on the images provided. Please see subspecialty report for full details and interpretation of real time imaging. Intraprocedural fluoroscopic spot images as above. See separate procedure report for more information. Patient Instructions:      Medication List      START taking these medications    enoxaparin Sodium 30 MG/0.3ML injection  Commonly known as: LOVENOX  Inject 0.3 mLs into the skin 2 times daily for 28 days     oxyCODONE-acetaminophen 5-325 MG per tablet  Commonly known as: PERCOCET  Take 1 tablet by mouth every 6 hours as needed for Pain for up to 3 days. CHANGE how you take these medications    Ozempic (1 MG/DOSE) 4 MG/3ML Sopn  Generic drug: Semaglutide (1 MG/DOSE)  What changed: Another medication with the same name was removed. Continue taking this medication, and follow the directions you see here.         CONTINUE taking these medications    aspirin 81 MG tablet     atorvastatin 40 MG tablet  Commonly known as: LIPITOR  Take 1 tablet by mouth nightly     canagliflozin-metFORMIN HCl  MG  Commonly known as: INVOKAMET     fenofibrate micronized 200 MG capsule  Commonly known as: LOFIBRA     glimepiride 2 MG tablet  Commonly known as: AMARYL     lisinopril 10 MG tablet  Commonly known as: PRINIVIL;ZESTRIL     MENS ONE DAILY PO     Vascepa 1 g Caps capsule  Generic drug: Icosapent Ethyl        STOP taking these medications    clopidogrel 75 MG tablet  Commonly known as: Plavix     Jardiance 25 MG tablet  Generic drug: empagliflozin Where to Get Your Medications      You can get these medications from any pharmacy    Bring a paper prescription for each of these medications  · enoxaparin Sodium 30 MG/0.3ML injection  · oxyCODONE-acetaminophen 5-325 MG per tablet           Note that more than 30 minutes was spent in preparing discharge papers, discussing discharge with patient, medication review, etc.    Signed:  Electronically signed by Steve Grissom MD on 4/21/2022 at 11:58 AM

## 2022-04-21 NOTE — PROGRESS NOTES
Occupational Therapy  OT BEDSIDE TREATMENT NOTE      Date:2022  Patient Name: Dannielle Paredes. MRN: [id number]  : 1955  Room: [address] [place] [address]     Per OT Eval:  Elsy Hernandez OTR/L [id number]       Referring Provider: Shalonda Fry DO    Specific Provider Orders/Date: OT eval and treat 22       Diagnosis: Closed fracture of neck of right femur, initial encounter [facility] Elijah Welsh  Fall, initial encounter [W19. XXXA]  Closed right hip fracture, initial encounter ([facility] [S72.001A]  Acute pain of left knee [M25.562]     Surgery: R hip ORIF 22     Pertinent Medical History: DM, HTN, OA, TIA       Precautions:  Fall Risk, TTWB RLE      Assessment of current deficits    [x]? Functional mobility            [x]?ADLs           [x]? Strength                  []?Cognition    [x]? Functional transfers          [x]? IADLs         [x]? Safety Awareness   [x]? Endurance    []? Fine Coordination                         [x]? Balance      []? Vision/perception   []? Sensation      []? Gross Motor Coordination             []? ROM           []?  Delirium                   []? Motor Control      OT PLAN OF CARE   OT POC based on physician orders, patient diagnosis and results of clinical assessment     Frequency/Duration 2-5 days/wk for 2 weeks PRN   Specific OT Treatment Interventions to include:   * Instruction/training on adapted ADL techniques and AE recommendations to increase functional independence within precautions       * Training on energy conservation strategies, correct breathing pattern and techniques to improve independence/tolerance for self-care routine  * Functional transfer/mobility training/DME recommendations for increased independence, safety, and fall prevention  * Patient/Family education to increase follow through with safety techniques and functional independence  * Recommendation of environmental modifications for increased safety with functional transfers/mobility and ADLs  * Therapeutic exercise to improve motor endurance, ROM, and functional strength for ADLs/functional transfers  * Therapeutic activities to facilitate/challenge dynamic balance, stand tolerance for increased safety and independence with ADLs           Recommended Adaptive Equipment: BSC     Home Living: Pt lives with wife in 1 story house with ramp entry.     Bathroom setup: tub/shower with extended tub bench and grab bars, standard height commode   Equipment owned: cane, shower chair     Prior Level of Function: independent with ADLs , independent with IADLs; functional mobility: cane     Pain Level: Pt did not report pain this date; pt agreeable to therapy  Cognition: A&O: Pt alert, pleasant and following commands                Functional Assessment:  AM-PAC Daily Activity Raw Score: 19/24    Initial Eval Status  Date: 4/20/22 Treatment Status  Date: 4/21/22 STGs = LTGs  Time frame: 10-14 days   Feeding Independent        Grooming SBA  To complete hand hygiene standing at sink   Mod I/I   UB Dressing SBA  SBA  To doff gown Mod I/I   LB Dressing Mod A  To adjust socks Min A  To don/doff socks   Mod I/I-with use of AD as appropriate/needed   Bathing Mod A   Mod I/I -with use of AD as appropriate/needed   Toileting Min A  For clothing management   Mod I/I    Bed Mobility  Pt in chair    Independent    Functional Transfers SBA with wheeled walker  Sit<>Stand from chair  Sit<>stand from [facility] over commode  Cues for hand placement and safe technique and wheeled walker use SBA with wheeled walker  Sit<>stand from chair  Tub transfer  Independent     Functional Mobility SBA with wheeled walker  To and from bathroom  Cues for sequencing and safe wheeled walker use and to maintain TTWB SBA with wheeled walker  Short distance in room  Independent -with device as needed to maximize independence with ADLs and functional task completion and good adherence to WB status   Balance Sitting:     Static:  Independent     Dynamic:SBA  Standing: SBA with wheeled walker  Sitting: independent  Standing: SBA with wheeled walker I for dynamic sitting balance to maximize independence with ADLs and functional task completion     I for standing balance to maximize independence with ADLs and functional task completion   Activity Tolerance Fair+ with light activity Good  Good with ADL activity. Pt will demonstrate good understanding of education provided on EC/WS techniques    Visual/  Perceptual Glasses: none at bedside         Comments: RN approved patient's participation in activities. Upon arrival, patient in therapy gym with PT. At end of session, patient sitting in chair with call light and phone within reach and all lines and tubes intact. Treatment: OT treatment provided this date included:    Instruction/training on safety and adapted techniques for completion of ADLs. Pt was SBA to doff gown from back. Pt Min A to doff/don socks with cues for safe technique.   Instruction/training on safe functional mobility/transfer techniques. Educated on safe tub transfer with extended tub bench. Pt SBA with cues for safe technique and for TTWB. Pt with demonstrated good understanding of transfer. Pt SBA to sit to chair and SBA for functional mobility with wheeled walker with cues for sequencing and adherence to TTWB. Further skilled OT treatment indicated to increase patient's safety and independence with completion of ADL/IADL tasks in order to maximize patient's functional independence and quality of life. Education: Patient education provided regardin) LB dressing technique. 2) safe tub transfer Patient demonstrated good understanding. · Patient has made progress towards set goals. · Continue OT plan of care.     Time In: 46  Time Out: 1005  Total Treatment Time: 19 minutes      Minutes Units   Therapeutic Ex 99375     Therapeutic Activities 62447 20 1   ADL/Self Care 01373     Orthotic Management 17550     Neuro Re-Ed 10117     Non-Billable Time ---       Con Bruins, OTR/L  License Number: [id number]

## 2022-04-21 NOTE — CARE COORDINATION
Updated plan of care. Plan is discharge home today with [place] home care-orders completed.  DME delivered and pt wants wheel chair-ordered and will deliver to home-o

## 2022-04-21 NOTE — PROGRESS NOTES
[facility] Quality Flow/Interdisciplinary Rounds Progress Note        Quality Flow Rounds held on April 21, 2022    Disciplines Attending:  Bedside Nurse, ,  and Nursing [facility] Loretta Yang. was admitted on 4/18/2022  3:45 PM    Anticipated Discharge Date:  Expected Discharge Date: 05/21/22    Disposition:    Regulo Score:  Regulo Scale Score: 20    Readmission Risk              Risk of Unplanned Readmission:  14           Discussed patient goal for the day, patient clinical progression, and barriers to discharge. The following Goal(s) of the Day/Commitment(s) have been identified:  Discharge planning.       Lorena Bermeo RN  April 21, 2022

## 2022-04-21 NOTE — PROGRESS NOTES
CLINICAL PHARMACY NOTE: MEDS TO BEDS    Total # of Prescriptions Filled: 1   The following medications were delivered to the patient:  · Enoxaparin 30  · Percocet 5/325    Additional Documentation:

## 2022-04-21 NOTE — PROGRESS NOTES
Educated patient on the importance of Incentive Spirometer, Patient returned demonstration of [phone], tolerated well.

## 2022-04-21 NOTE — PLAN OF CARE
Problem: Pain:  Goal: Pain level will decrease  Description: Pain level will decrease  Outcome: Completed  Goal: Control of acute pain  Description: Control of acute pain  Outcome: Completed     Problem: Falls - Risk of:  Goal: Will remain free from falls  Description: Will remain free from falls  Outcome: Completed     Problem: Discharge Planning  Goal: Discharge to home or other facility with appropriate resources  Outcome: Completed     Problem: ABCDS Injury Assessment  Goal: Absence of physical injury  Outcome: Completed     Problem: Skin/Tissue Integrity  Goal: Absence of new skin breakdown  Description: 1. Monitor for areas of redness and/or skin breakdown  2. Assess vascular access sites hourly  3. Every 4-6 hours minimum:  Change oxygen saturation probe site  4. Every 4-6 hours:  If on nasal continuous positive airway pressure, respiratory therapy assess nares and determine need for appliance change or resting period.   Outcome: Completed

## 2024-09-14 ENCOUNTER — APPOINTMENT (OUTPATIENT)
Dept: ULTRASOUND IMAGING | Age: 69
End: 2024-09-14
Payer: MEDICARE

## 2024-09-14 ENCOUNTER — HOSPITAL ENCOUNTER (EMERGENCY)
Age: 69
Discharge: HOME | End: 2024-09-15
Attending: EMERGENCY MEDICINE
Payer: MEDICARE

## 2024-09-14 ENCOUNTER — APPOINTMENT (OUTPATIENT)
Dept: GENERAL RADIOLOGY | Age: 69
End: 2024-09-14
Payer: MEDICARE

## 2024-09-14 DIAGNOSIS — R60.0 BILATERAL LOWER EXTREMITY EDEMA: Primary | ICD-10-CM

## 2024-09-14 DIAGNOSIS — M79.604 BILATERAL LOWER EXTREMITY PAIN: ICD-10-CM

## 2024-09-14 DIAGNOSIS — M79.605 BILATERAL LOWER EXTREMITY PAIN: ICD-10-CM

## 2024-09-14 LAB
ALBUMIN SERPL-MCNC: 4.4 G/DL (ref 3.5–5.2)
ALP SERPL-CCNC: 56 U/L (ref 40–129)
ALT SERPL-CCNC: 20 U/L (ref 0–40)
ANION GAP SERPL CALCULATED.3IONS-SCNC: 12 MMOL/L (ref 7–16)
AST SERPL-CCNC: 25 U/L (ref 0–39)
BASOPHILS # BLD: 0.03 K/UL (ref 0–0.2)
BASOPHILS NFR BLD: 0 % (ref 0–2)
BILIRUB SERPL-MCNC: 0.4 MG/DL (ref 0–1.2)
BNP SERPL-MCNC: 138 PG/ML (ref 0–125)
BUN SERPL-MCNC: 10 MG/DL (ref 6–23)
CALCIUM SERPL-MCNC: 10 MG/DL (ref 8.6–10.2)
CHLORIDE SERPL-SCNC: 106 MMOL/L (ref 98–107)
CO2 SERPL-SCNC: 23 MMOL/L (ref 22–29)
CREAT SERPL-MCNC: 0.7 MG/DL (ref 0.7–1.2)
EKG ATRIAL RATE: 69 BPM
EKG P AXIS: 43 DEGREES
EKG P-R INTERVAL: 154 MS
EKG Q-T INTERVAL: 368 MS
EKG QRS DURATION: 80 MS
EKG QTC CALCULATION (BAZETT): 394 MS
EKG R AXIS: 59 DEGREES
EKG T AXIS: 38 DEGREES
EKG VENTRICULAR RATE: 69 BPM
EOSINOPHIL # BLD: 0.31 K/UL (ref 0.05–0.5)
EOSINOPHILS RELATIVE PERCENT: 5 % (ref 0–6)
ERYTHROCYTE [DISTWIDTH] IN BLOOD BY AUTOMATED COUNT: 13.2 % (ref 11.5–15)
GFR, ESTIMATED: >90 ML/MIN/1.73M2
GLUCOSE SERPL-MCNC: 127 MG/DL (ref 74–99)
HCT VFR BLD AUTO: 42.1 % (ref 37–54)
HGB BLD-MCNC: 13.9 G/DL (ref 12.5–16.5)
IMM GRANULOCYTES # BLD AUTO: <0.03 K/UL (ref 0–0.58)
IMM GRANULOCYTES NFR BLD: 0 % (ref 0–5)
LYMPHOCYTES NFR BLD: 2.08 K/UL (ref 1.5–4)
LYMPHOCYTES RELATIVE PERCENT: 31 % (ref 20–42)
MCH RBC QN AUTO: 30.8 PG (ref 26–35)
MCHC RBC AUTO-ENTMCNC: 33 G/DL (ref 32–34.5)
MCV RBC AUTO: 93.3 FL (ref 80–99.9)
MONOCYTES NFR BLD: 0.52 K/UL (ref 0.1–0.95)
MONOCYTES NFR BLD: 8 % (ref 2–12)
NEUTROPHILS NFR BLD: 56 % (ref 43–80)
NEUTS SEG NFR BLD: 3.73 K/UL (ref 1.8–7.3)
PLATELET # BLD AUTO: 219 K/UL (ref 130–450)
PMV BLD AUTO: 8.9 FL (ref 7–12)
POTASSIUM SERPL-SCNC: 3.9 MMOL/L (ref 3.5–5)
PROT SERPL-MCNC: 7.1 G/DL (ref 6.4–8.3)
RBC # BLD AUTO: 4.51 M/UL (ref 3.8–5.8)
SODIUM SERPL-SCNC: 141 MMOL/L (ref 132–146)
TROPONIN I SERPL HS-MCNC: 14 NG/L (ref 0–11)
WBC OTHER # BLD: 6.7 K/UL (ref 4.5–11.5)

## 2024-09-14 PROCEDURE — 80053 COMPREHEN METABOLIC PANEL: CPT

## 2024-09-14 PROCEDURE — 93005 ELECTROCARDIOGRAM TRACING: CPT

## 2024-09-14 PROCEDURE — 71045 X-RAY EXAM CHEST 1 VIEW: CPT

## 2024-09-14 PROCEDURE — 85025 COMPLETE CBC W/AUTO DIFF WBC: CPT

## 2024-09-14 PROCEDURE — 84484 ASSAY OF TROPONIN QUANT: CPT

## 2024-09-14 PROCEDURE — 99285 EMERGENCY DEPT VISIT HI MDM: CPT

## 2024-09-14 PROCEDURE — 83880 ASSAY OF NATRIURETIC PEPTIDE: CPT

## 2024-09-14 PROCEDURE — 93970 EXTREMITY STUDY: CPT

## 2024-09-14 RX ORDER — NAPROXEN 500 MG/1
500 TABLET ORAL 2 TIMES DAILY
Qty: 14 TABLET | Refills: 0 | Status: SHIPPED | OUTPATIENT
Start: 2024-09-14 | End: 2024-09-21

## 2024-09-14 ASSESSMENT — PAIN - FUNCTIONAL ASSESSMENT: PAIN_FUNCTIONAL_ASSESSMENT: 0-10

## 2024-09-14 ASSESSMENT — PAIN DESCRIPTION - LOCATION: LOCATION: LEG

## 2024-09-14 ASSESSMENT — PAIN DESCRIPTION - ORIENTATION: ORIENTATION: LEFT;RIGHT

## 2024-09-14 ASSESSMENT — PAIN SCALES - GENERAL: PAINLEVEL_OUTOF10: 8

## 2024-09-15 VITALS
HEIGHT: 76 IN | RESPIRATION RATE: 16 BRPM | OXYGEN SATURATION: 98 % | DIASTOLIC BLOOD PRESSURE: 82 MMHG | BODY MASS INDEX: 28.01 KG/M2 | SYSTOLIC BLOOD PRESSURE: 117 MMHG | HEART RATE: 73 BPM | WEIGHT: 230 LBS | TEMPERATURE: 98.1 F

## 2024-09-15 LAB — TROPONIN I SERPL HS-MCNC: 16 NG/L (ref 0–11)

## 2024-09-16 LAB
EKG ATRIAL RATE: 69 BPM
EKG P AXIS: 43 DEGREES
EKG P-R INTERVAL: 154 MS
EKG Q-T INTERVAL: 368 MS
EKG QRS DURATION: 80 MS
EKG QTC CALCULATION (BAZETT): 394 MS
EKG R AXIS: 59 DEGREES
EKG T AXIS: 38 DEGREES
EKG VENTRICULAR RATE: 69 BPM

## 2024-09-16 PROCEDURE — 93010 ELECTROCARDIOGRAM REPORT: CPT | Performed by: INTERNAL MEDICINE

## 2025-05-06 NOTE — H&P
[facility] History and Physical        Chief Complaint:  Hip pain  History of Present Illness   The patient is a 73 y.o. male    No recent fx hx or osteoporosis/chronic steroids or vit D def hx    PCP Ralph Rodriguez  Hx of TKA on L side - seen ortho- dr. Ramin Mann here  Today while walking straight (no misstep or twisting)  Felt L knee buckle and pain- L hip/groin/down to knee- immediate pain with WB activities and ROM    In ER noted fracture hip  -- still moderate hip pain- despite norco given in ER  Also dm and hungry  Hx of TIA, no recent chest pain, no hx of MI  Low cardiac risk  Not on blood thinners other than ASA daily 182 +plavix    - hx taken from the patient  REVIEW OF SYSTEMS:  no fevers, chills, cp, sob, n/v, ha, vision/hearing changes, wt changes, hot/cold flashes, other open skin lesions, diarrhea, constipation, dysuria/hematuria unless noted in HPI. Complete ROS performed with the patient and is otherwise negative. Past Medical History:      Diagnosis Date    Diabetes mellitus ([address]     Hyperlipidemia     Hypertension     Osteoarthritis     TIA (transient ischemic attack)        Past Surgical History:        Procedure Laterality Date    KNEE SURGERY Left     fracture repair    ROTATOR CUFF REPAIR Bilateral     TOTAL KNEE ARTHROPLASTY Left 02/16/2017    x3        Home Medications:  Prior to Admission medications    Medication Sig Start Date End Date Taking? Authorizing Provider   canagliflozin-metformin HCl (INVOKAMET)  MG Take 50-1,000 mg by mouth    Historical Provider, MD   fenofibrate micronized (LOFIBRA) 200 MG capsule TAKE ONE CAPSULE BY MOUTH ONCE EVERY DAY WITH A MEAL.  1/22/20   Historical Provider, MD   VASCEPA 1 g CAPS capsule TAKE TWO CAPSULES BY MOUTH TWO TIMES A DAY WITH MEALS 1/27/20   Historical Provider, MD   glimepiride (AMARYL) 2 MG tablet Take 2 mg by mouth    Historical Provider, MD   Semaglutide,0.25 or 0.5MG/DOS, 2 MG/1.5ML SOPN Inject 0.5 mg into the skin    Historical Provider, MD   aspirin 81 MG tablet Take 162 mg by mouth daily    Historical Provider, MD   clopidogrel (PLAVIX) 75 MG tablet Take 1 tablet by mouth daily 2/17/20   JAMAL Salazar - NP   atorvastatin (LIPITOR) 40 MG tablet Take 1 tablet by mouth nightly 7/10/19   Remea Bauer APRN - CNP   lisinopril (PRINIVIL;ZESTRIL) 10 MG tablet Take 10 mg by mouth daily    Historical Provider, MD   Multiple Vitamins-Minerals (MENS ONE DAILY PO) Take by mouth daily    Historical Provider, MD       Allergies:  Acetaminophen-codeine    Social History:   TOBACCO:   reports that he has quit smoking. He has never used smokeless tobacco.  ETOH:   reports current alcohol use. Family History:   No family history on file. Or deferred/otherwise considered non contributory to current admission  PHYSICAL EXAM:    VS: BP (!) 124/96   Pulse 97   Temp 97.8 °F (36.6 °C)   Resp 16   Ht 6' 4\" (1.93 m)   Wt 233 lb (105.7 kg)   SpO2 94%   BMI 28.36 kg/m²     General Appearance:     no acute distress. Psych:  HEENT:    A.O. As per HPI details  NC/AT, PERRL, no pallor no icterus, lips/ext mucous membrane grossly N    Neck:   Supple, trachea midline, no obvious JVD   Resp:     CTAB, No wheezes, No rhonchi   Chest wall:    No tenderness or deformity   Heart:    Regular rate and rhythm, S1 and S2 normal, no rub or gallop. Abdomen:     Soft, non-tender, bowel sounds active    no suspicious obvious masses/organomegaly   Genitalia & Rectal:    Deferred.    Extremities x4:   R hip dec painful ROM hip and knee  Otherwise Extremities normal, atraumatic, no cyanosis, no clubbing   Musculoskeletal:      NO active synovitis or swollen b/l wrists, 2-5 MCPs examined   Skin:   Skin color, texture, turgor fairly normal, no ACUTE rashes or lesions in lower legs and arms examined   Lymph nodes:   Cervical nodes grossly normal   Neurologic:  .Grossly symmetric  strength in UEs and LEs with symmetric grossly intact to light touch sensation     LABS:  CBC:   Lab Results   Component Value Date    WBC 5.4 04/18/2022    RBC 5.03 04/18/2022    HGB 15.6 04/18/2022    HCT 46.3 04/18/2022     04/18/2022    MCV 92.0 04/18/2022     BMP:    Lab Results   Component Value Date     04/18/2022    K 4.8 04/18/2022     04/18/2022    CO2 23 04/18/2022    BUN 14 04/18/2022    CREATININE 0.7 04/18/2022    GLUCOSE 155 04/18/2022    CALCIUM 9.8 04/18/2022     Hepatic Function Panel:    Lab Results   Component Value Date    ALKPHOS 47 07/09/2019    AST 33 07/09/2019    ALT 25 07/09/2019    PROT 6.5 07/09/2019    LABALBU 4.2 07/09/2019    BILITOT 0.3 07/09/2019     Magnesium:    Lab Results   Component Value Date    MG 2.1 07/09/2019       PT/INR:    Lab Results   Component Value Date    PROTIME 11.3 04/18/2022    INR 1.0 04/18/2022     U/A: No results found for: NITRITE, LEUKOCYTESUR, PHUR, WBCUA, RBCUA, BACTERIA, SPECGRAV, BLOODU, GLUCOSEU  ABG:  No results found for: PHART, OBD2HOJ, PO2ART, D4NQTNLG, LWD9JFN, BEART  TSH:  No results found for: TSH  Cardiac Enzymes:   Lab Results   Component Value Date    CKTOTAL 106 07/08/2019    CKMB 2.6 07/08/2019    TROPONINI <0.01 07/08/2019       Radiology: XR CHEST (2 VW)    Result Date: 4/18/2022  EXAMINATION: TWO XRAY VIEWS OF THE CHEST 4/18/2022 5:38 pm COMPARISON: None. HISTORY: ORDERING SYSTEM PROVIDED HISTORY: pre-op TECHNOLOGIST PROVIDED HISTORY: Reason for exam:->pre-op FINDINGS: The lungs are without acute focal process. There is no effusion or pneumothorax. The cardiomediastinal silhouette is without acute process. The osseous structures are without acute process. No acute process. XR HIP BILATERAL W AP PELVIS (2 VIEWS)    Result Date: 4/18/2022  EXAMINATION: ONE XRAY VIEW OF THE PELVIS AND TWO XRAY VIEWS OF EACH OF THE BILATERAL HIPS 4/18/2022 4:10 pm COMPARISON: None.  HISTORY: ORDERING SYSTEM PROVIDED HISTORY: right groin and hip pain TECHNOLOGIST PROVIDED HISTORY: Reason for exam:->right groin and hip pain FINDINGS: There is a mildly displaced subcapital right femoral fracture. CT scan of the right hip can be performed for further, more detailed evaluation. There is no evidence of dislocation. No other fracture is seen. Osteo-degenerative changes are identified involving the visualized lower lumbar spine. Small bilateral iliac crest enthesopathies are noted. No other significant osseous or surrounding soft tissue abnormality is appreciated. Mildly displaced subcapital right femoral fracture. CT scan of the right hip can be performed for further, more detailed evaluation. Osteo-degenerative changes, as described above. XR KNEE RIGHT (3 VIEWS)    Result Date: 4/18/2022  EXAMINATION: THREE XRAY VIEWS OF THE RIGHT KNEE 4/18/2022 4:10 pm COMPARISON: None. HISTORY: ORDERING SYSTEM PROVIDED HISTORY: fall TECHNOLOGIST PROVIDED HISTORY: Reason for exam:->fall FINDINGS: There are moderate tricompartmental arthritic changes. Subtle cortical depression noted along the articulating surface of the lateral femoral condyle, possibly due to old injury. There are no acute fractures or dislocations. There are multiple calcified nodules posterior to the knee. Minimal suprapatellar soft tissue swelling. Moderate tricompartmental osteoarthritis. No acute bony abnormalities. Question small joint effusion. XR ANKLE RIGHT (MIN 3 VIEWS)    Result Date: 4/18/2022  EXAMINATION: THREE XRAY VIEWS OF THE RIGHT ANKLE 4/18/2022 4:10 pm COMPARISON: None. HISTORY: ORDERING SYSTEM PROVIDED HISTORY: fall TECHNOLOGIST PROVIDED HISTORY: Reason for exam:->fall FINDINGS: No evidence of acute fracture or dislocation. Normal alignment of the ankle mortise. No focal osseous lesion. No evidence of joint effusion. No focal soft tissue abnormality. No acute abnormality of the ankle. Small plantar calcaneal spur.      CT HIP RIGHT WO CONTRAST    Result Date: 4/18/2022  EXAMINATION: CT OF THE RIGHT HIP WITHOUT CONTRAST 4/18/2022 6:41 pm TECHNIQUE: CT of the right hip was performed without the administration of intravenous contrast.  Multiplanar reformatted images are provided for review. Dose modulation, iterative reconstruction, and/or weight based adjustment of the mA/kV was utilized to reduce the radiation dose to as low as reasonably achievable. COMPARISON: Correlation made radiograph performed today. HISTORY ORDERING SYSTEM PROVIDED HISTORY: fx right femoral neck on xray imaging TECHNOLOGIST PROVIDED HISTORY: Reason for exam:->fx right femoral neck on xray imaging Decision Support Exception - unselect if not a suspected or confirmed emergency medical condition->Emergency Medical Condition (MA) FINDINGS: Mildly displaced right femoral neck fracture. Femoral head maintains articulation with the acetabulum. No significant synovial effusion. No loose intra-articular fracture fragment. Mild osteophytosis along superior margin of right acetabulum. No evidence of fracture involving visualized right aspect of the pelvis. No free fluid collections in visualized pelvis. Mildly displaced subcapital right femoral neck fracture. EKG:  Normal sinus rhythm  Low voltage QRS  Cannot rule out Anterior infarct , age undetermined  Abnormal ECG   Assessment & Plan   ACTIVE hospital problems being addressed/reassessed for this admission:  Principal Problem:    Closed right hip fracture, initial encounter [facility])  Active Problems:    Diabetes mellitus ([address]    TIA (transient ischemic attack)  Resolved Problems:    * No resolved hospital problems.  *    Code status/DVT prophylaxis and PLAN --see orders   Note extensive time spent coordinating care between ER docs, ER and floor nurses, and transitioning care over to day providers  Plan of care/ clinical impressions/communication specifics detailed below:     73 y.o. male    No recent fx hx or osteoporosis/chronic steroids or vit D def hx  --buit years ago did fracture L knee-- and had several surgeries for that over the years      [facility], [facility]  Hx of TKA on L side - seen ortho- dr. April Escobedo here  Today while walking straight (no misstep or twisting)  Felt L knee buckle and pain- L hip/groin/down to knee- immediate pain with WB activities and ROM    CT hip-  Mildly displaced subcapital right femoral neck fracture.    In ER noted fracture hip--ortho consult  -- still moderate hip pain- despite norco given in ER-- will inc pain meds prn  Also dm and hungry-- hold gliflozin, GLP1RA, metfromin  -- give SS while here    Hx of TIA, no recent chest pain, no hx of MI  Low cardiac risk for hip pinning surgery  Not on blood thinners other than ASA daily 182 +plavix      Maldonado Mcdonough MD   Night Officer, overnight admitting doctor at [place] call day time doctor   for questions after 7:30am    Covering for [address] Service  If Qs please call [phone]  Electronically signed by Ivette Grant MD on 4/18/2022 at 7:48 PM 4 = No assist / stand by assistance

## (undated) DEVICE — 4-PORT MANIFOLD: Brand: NEPTUNE 2

## (undated) DEVICE — BIT DRL L413MM DIA4.3MM FOR FEM NK SYSTEN

## (undated) DEVICE — DRAPE C ARM W41XL74IN UNIV MOB W RUBBERBAND CLP

## (undated) DEVICE — SET ORTHO STD STORTSTD1

## (undated) DEVICE — DRESSING,GAUZE,XEROFORM,CURAD,1"X8",ST: Brand: CURAD

## (undated) DEVICE — CHLORAPREP 26ML ORANGE

## (undated) DEVICE — PADDING CAST W6INXL4YD COT LO LINTING WYTEX

## (undated) DEVICE — Device

## (undated) DEVICE — DRAPE ISOLATN PT ST W/POCKET

## (undated) DEVICE — TRAY SYSTEM 7 DRILL REUSABLE

## (undated) DEVICE — CONTROL SYRINGE LUER-LOCK TIP: Brand: MONOJECT

## (undated) DEVICE — REAMER SURG FOR FEM NK COMPLETE OPENING SYS NS

## (undated) DEVICE — BANDAGE,GAUZE,BULKEE II,4.5"X4.1YD,STRL: Brand: MEDLINE

## (undated) DEVICE — GLOVE SURG SZ 8 L12IN FNGR THK79MIL GRN LTX FREE

## (undated) DEVICE — Device: Brand: SALVATION™

## (undated) DEVICE — GAUZE,SPONGE,4"X4",8PLY,STRL,LF,10/TRAY: Brand: MEDLINE

## (undated) DEVICE — NEEDLE HYPO 22GA L1.5IN BLK POLYPR HUB S STL REG BVL STR

## (undated) DEVICE — 3M™ TEGADERM™ TRANSPARENT FILM DRESSING FRAME STYLE, 1626W, 4 IN X 4-3/4 IN (10 CM X 12 CM), 50/CT 4CT/CASE: Brand: 3M™ TEGADERM™

## (undated) DEVICE — GLOVE SURG SZ 8 CRM LTX FREE POLYISOPRENE POLYMER BEAD ANTI

## (undated) DEVICE — PAD PERINL POST FOAM DISPOSABLE FOR AMSCO SURG TBL

## (undated) DEVICE — PACK PROCEDURE SURG GEN CUST

## (undated) DEVICE — TAPE ADH W3INXL10YD WHT COT WVN BK POWERFUL RUB BASE HIGHLY

## (undated) DEVICE — PADDING,UNDERCAST,COTTON, 4"X4YD STERILE: Brand: MEDLINE

## (undated) DEVICE — DOUBLE BASIN SET: Brand: MEDLINE INDUSTRIES, INC.

## (undated) DEVICE — INSTRUMENT SYSTEM 7 BATTERY REUSABLE

## (undated) DEVICE — SPONGE LAP W18XL18IN WHT COT 4 PLY FLD STRUNG RADPQ DISP ST

## (undated) DEVICE — ELECTRODE PT RET AD L9FT HI MOIST COND ADH HYDRGEL CORDED

## (undated) DEVICE — GUIDEWIRE ORTH L400MM DIA3.2MM FOR TFN